# Patient Record
Sex: FEMALE | Race: BLACK OR AFRICAN AMERICAN | Employment: UNEMPLOYED | URBAN - METROPOLITAN AREA
[De-identification: names, ages, dates, MRNs, and addresses within clinical notes are randomized per-mention and may not be internally consistent; named-entity substitution may affect disease eponyms.]

---

## 2017-01-27 ENCOUNTER — TELEPHONE (OUTPATIENT)
Dept: ORTHOPEDIC SURGERY | Age: 62
End: 2017-01-27

## 2017-02-17 ENCOUNTER — TELEPHONE (OUTPATIENT)
Dept: ORTHOPEDIC SURGERY | Age: 62
End: 2017-02-17

## 2017-02-20 RX ORDER — TRAMADOL HYDROCHLORIDE 50 MG/1
50 TABLET ORAL EVERY 6 HOURS PRN
Qty: 40 TABLET | Refills: 0 | Status: CANCELLED | OUTPATIENT
Start: 2017-02-20

## 2017-02-27 ENCOUNTER — HOSPITAL ENCOUNTER (OUTPATIENT)
Dept: PHYSICAL THERAPY | Age: 62
Discharge: HOME | End: 2017-02-28
Admitting: ORTHOPAEDIC SURGERY

## 2017-04-05 ENCOUNTER — HOSPITAL ENCOUNTER (OUTPATIENT)
Dept: PHYSICAL THERAPY | Age: 62
Discharge: HOME | End: 2017-04-30
Admitting: ORTHOPAEDIC SURGERY

## 2017-09-08 PROBLEM — G45.9 TIA (TRANSIENT ISCHEMIC ATTACK): Status: ACTIVE | Noted: 2017-09-08

## 2017-09-08 PROBLEM — R91.1 NODULE OF RIGHT LUNG: Status: ACTIVE | Noted: 2017-09-08

## 2018-08-26 PROBLEM — I63.9 STROKE OF UNKNOWN CAUSE (HCC): Status: ACTIVE | Noted: 2018-08-26

## 2018-08-26 PROBLEM — R20.2 FACIAL PARESTHESIA: Status: ACTIVE | Noted: 2018-08-26

## 2018-10-24 RX ORDER — CALCIUM CARBONATE 500(1250)
500 TABLET ORAL DAILY
COMMUNITY
End: 2021-08-09

## 2018-10-26 ENCOUNTER — APPOINTMENT (OUTPATIENT)
Dept: GENERAL RADIOLOGY | Age: 63
End: 2018-10-26
Attending: PODIATRIST
Payer: MEDICAID

## 2018-10-26 ENCOUNTER — ANESTHESIA (OUTPATIENT)
Dept: OPERATING ROOM | Age: 63
End: 2018-10-26
Payer: MEDICAID

## 2018-10-26 ENCOUNTER — ANESTHESIA EVENT (OUTPATIENT)
Dept: OPERATING ROOM | Age: 63
End: 2018-10-26
Payer: MEDICAID

## 2018-10-26 ENCOUNTER — HOSPITAL ENCOUNTER (OUTPATIENT)
Age: 63
Setting detail: OUTPATIENT SURGERY
Discharge: HOME | End: 2018-10-26
Attending: PODIATRIST | Admitting: PODIATRIST
Payer: MEDICAID

## 2018-10-26 VITALS
TEMPERATURE: 97.7 F | OXYGEN SATURATION: 100 % | DIASTOLIC BLOOD PRESSURE: 57 MMHG | SYSTOLIC BLOOD PRESSURE: 109 MMHG | RESPIRATION RATE: 20 BRPM

## 2018-10-26 VITALS
BODY MASS INDEX: 35.91 KG/M2 | HEART RATE: 75 BPM | OXYGEN SATURATION: 96 % | DIASTOLIC BLOOD PRESSURE: 73 MMHG | RESPIRATION RATE: 14 BRPM | HEIGHT: 67 IN | SYSTOLIC BLOOD PRESSURE: 103 MMHG | WEIGHT: 228.8 LBS | TEMPERATURE: 97.5 F

## 2018-10-26 LAB
GLUCOSE BLD-MCNC: 101 MG/DL (ref 70–99)
GLUCOSE BLD-MCNC: 102 MG/DL (ref 70–99)
PERFORMED ON: ABNORMAL
PERFORMED ON: ABNORMAL

## 2018-10-26 PROCEDURE — 7100000011 HC PHASE II RECOVERY - ADDTL 15 MIN: Performed by: PODIATRIST

## 2018-10-26 PROCEDURE — 93010 ELECTROCARDIOGRAM REPORT: CPT | Performed by: INTERNAL MEDICINE

## 2018-10-26 PROCEDURE — 93005 ELECTROCARDIOGRAM TRACING: CPT | Performed by: PODIATRIST

## 2018-10-26 PROCEDURE — 2500000003 HC RX 250 WO HCPCS: Performed by: PODIATRIST

## 2018-10-26 PROCEDURE — 2500000003 HC RX 250 WO HCPCS: Performed by: NURSE ANESTHETIST, CERTIFIED REGISTERED

## 2018-10-26 PROCEDURE — 3700000000 HC ANESTHESIA ATTENDED CARE: Performed by: PODIATRIST

## 2018-10-26 PROCEDURE — 6360000002 HC RX W HCPCS: Performed by: PODIATRIST

## 2018-10-26 PROCEDURE — C9359 IMPLNT,BON VOID FILLER-PUTTY: HCPCS | Performed by: PODIATRIST

## 2018-10-26 PROCEDURE — 7100000001 HC PACU RECOVERY - ADDTL 15 MIN: Performed by: PODIATRIST

## 2018-10-26 PROCEDURE — 6360000002 HC RX W HCPCS: Performed by: NURSE ANESTHETIST, CERTIFIED REGISTERED

## 2018-10-26 PROCEDURE — 2580000003 HC RX 258: Performed by: NURSE ANESTHETIST, CERTIFIED REGISTERED

## 2018-10-26 PROCEDURE — 93005 ELECTROCARDIOGRAM TRACING: CPT

## 2018-10-26 PROCEDURE — 7100000000 HC PACU RECOVERY - FIRST 15 MIN: Performed by: PODIATRIST

## 2018-10-26 PROCEDURE — 6370000000 HC RX 637 (ALT 250 FOR IP): Performed by: ANESTHESIOLOGY

## 2018-10-26 PROCEDURE — 3700000001 HC ADD 15 MINUTES (ANESTHESIA): Performed by: PODIATRIST

## 2018-10-26 PROCEDURE — 2709999900 HC NON-CHARGEABLE SUPPLY: Performed by: PODIATRIST

## 2018-10-26 PROCEDURE — 2580000003 HC RX 258: Performed by: PODIATRIST

## 2018-10-26 PROCEDURE — 3600000004 HC SURGERY LEVEL 4 BASE: Performed by: PODIATRIST

## 2018-10-26 PROCEDURE — 3209999900 FLUORO FOR SURGICAL PROCEDURES

## 2018-10-26 PROCEDURE — 2720000010 HC SURG SUPPLY STERILE: Performed by: PODIATRIST

## 2018-10-26 PROCEDURE — 3600000014 HC SURGERY LEVEL 4 ADDTL 15MIN: Performed by: PODIATRIST

## 2018-10-26 PROCEDURE — 73620 X-RAY EXAM OF FOOT: CPT

## 2018-10-26 PROCEDURE — C1713 ANCHOR/SCREW BN/BN,TIS/BN: HCPCS | Performed by: PODIATRIST

## 2018-10-26 PROCEDURE — 2780000010 HC IMPLANT OTHER: Performed by: PODIATRIST

## 2018-10-26 PROCEDURE — 7100000010 HC PHASE II RECOVERY - FIRST 15 MIN: Performed by: PODIATRIST

## 2018-10-26 PROCEDURE — 6370000000 HC RX 637 (ALT 250 FOR IP)

## 2018-10-26 DEVICE — IMPLANTABLE DEVICE: Type: IMPLANTABLE DEVICE | Site: FOOT | Status: FUNCTIONAL

## 2018-10-26 DEVICE — ALLOGRAFT HUM TISS INJ LG 1 CC STRL ALLOGEN AMINON: Type: IMPLANTABLE DEVICE | Site: FOOT | Status: FUNCTIONAL

## 2018-10-26 DEVICE — MINI MONSTER HEADLESS, SHORT THREAD, 3.0 X 20MM
Type: IMPLANTABLE DEVICE | Site: FOOT | Status: FUNCTIONAL
Brand: MONSTER SCREW SYSTEM

## 2018-10-26 DEVICE — GRAFT BNE 5ML DEMIN BNE MTRX PTY INTERGRO: Type: IMPLANTABLE DEVICE | Site: FOOT | Status: FUNCTIONAL

## 2018-10-26 DEVICE — MINI MONSTER HEADLESS, SHORT THREAD, 3.0 X 18MM
Type: IMPLANTABLE DEVICE | Site: FOOT | Status: FUNCTIONAL
Brand: MONSTER SCREW SYSTEM

## 2018-10-26 RX ORDER — ONDANSETRON 2 MG/ML
4 INJECTION INTRAMUSCULAR; INTRAVENOUS
Status: DISCONTINUED | OUTPATIENT
Start: 2018-10-26 | End: 2018-10-26 | Stop reason: HOSPADM

## 2018-10-26 RX ORDER — FENTANYL CITRATE 50 UG/ML
50 INJECTION, SOLUTION INTRAMUSCULAR; INTRAVENOUS EVERY 5 MIN PRN
Status: DISCONTINUED | OUTPATIENT
Start: 2018-10-26 | End: 2018-10-26 | Stop reason: HOSPADM

## 2018-10-26 RX ORDER — DIPHENHYDRAMINE HYDROCHLORIDE 50 MG/ML
12.5 INJECTION INTRAMUSCULAR; INTRAVENOUS
Status: DISCONTINUED | OUTPATIENT
Start: 2018-10-26 | End: 2018-10-26 | Stop reason: HOSPADM

## 2018-10-26 RX ORDER — OXYCODONE HYDROCHLORIDE AND ACETAMINOPHEN 5; 325 MG/1; MG/1
TABLET ORAL
Status: COMPLETED
Start: 2018-10-26 | End: 2018-10-26

## 2018-10-26 RX ORDER — PROPOFOL 10 MG/ML
INJECTION, EMULSION INTRAVENOUS PRN
Status: DISCONTINUED | OUTPATIENT
Start: 2018-10-26 | End: 2018-10-26 | Stop reason: SDUPTHER

## 2018-10-26 RX ORDER — LIDOCAINE HYDROCHLORIDE 10 MG/ML
0.5 INJECTION, SOLUTION EPIDURAL; INFILTRATION; INTRACAUDAL; PERINEURAL ONCE
Status: DISCONTINUED | OUTPATIENT
Start: 2018-10-26 | End: 2018-10-26 | Stop reason: HOSPADM

## 2018-10-26 RX ORDER — FENTANYL CITRATE 50 UG/ML
25 INJECTION, SOLUTION INTRAMUSCULAR; INTRAVENOUS EVERY 5 MIN PRN
Status: DISCONTINUED | OUTPATIENT
Start: 2018-10-26 | End: 2018-10-26 | Stop reason: HOSPADM

## 2018-10-26 RX ORDER — FIBRINOGEN HUMAN AND THROMBIN HUMAN 10 ML
KIT TOPICAL
Status: COMPLETED | OUTPATIENT
Start: 2018-10-26 | End: 2018-10-26

## 2018-10-26 RX ORDER — SODIUM CHLORIDE 9 MG/ML
INJECTION, SOLUTION INTRAVENOUS CONTINUOUS
Status: DISCONTINUED | OUTPATIENT
Start: 2018-10-26 | End: 2018-10-26 | Stop reason: HOSPADM

## 2018-10-26 RX ORDER — MEPERIDINE HYDROCHLORIDE 25 MG/ML
12.5 INJECTION INTRAMUSCULAR; INTRAVENOUS; SUBCUTANEOUS EVERY 5 MIN PRN
Status: DISCONTINUED | OUTPATIENT
Start: 2018-10-26 | End: 2018-10-26 | Stop reason: HOSPADM

## 2018-10-26 RX ORDER — CEFAZOLIN SODIUM 2 G/100ML
2 INJECTION, SOLUTION INTRAVENOUS
Status: COMPLETED | OUTPATIENT
Start: 2018-10-26 | End: 2018-10-26

## 2018-10-26 RX ORDER — SODIUM CHLORIDE 9 MG/ML
INJECTION, SOLUTION INTRAVENOUS CONTINUOUS PRN
Status: DISCONTINUED | OUTPATIENT
Start: 2018-10-26 | End: 2018-10-26 | Stop reason: SDUPTHER

## 2018-10-26 RX ORDER — DEXAMETHASONE SODIUM PHOSPHATE 4 MG/ML
INJECTION, SOLUTION INTRA-ARTICULAR; INTRALESIONAL; INTRAMUSCULAR; INTRAVENOUS; SOFT TISSUE PRN
Status: DISCONTINUED | OUTPATIENT
Start: 2018-10-26 | End: 2018-10-26 | Stop reason: SDUPTHER

## 2018-10-26 RX ORDER — LIDOCAINE HYDROCHLORIDE 20 MG/ML
INJECTION, SOLUTION INFILTRATION; PERINEURAL
Status: COMPLETED | OUTPATIENT
Start: 2018-10-26 | End: 2018-10-26

## 2018-10-26 RX ORDER — ONDANSETRON 2 MG/ML
INJECTION INTRAMUSCULAR; INTRAVENOUS PRN
Status: DISCONTINUED | OUTPATIENT
Start: 2018-10-26 | End: 2018-10-26 | Stop reason: SDUPTHER

## 2018-10-26 RX ORDER — ACETAMINOPHEN 650 MG
TABLET, EXTENDED RELEASE ORAL
Status: COMPLETED | OUTPATIENT
Start: 2018-10-26 | End: 2018-10-26

## 2018-10-26 RX ORDER — LIDOCAINE HYDROCHLORIDE 20 MG/ML
INJECTION, SOLUTION INFILTRATION; PERINEURAL PRN
Status: DISCONTINUED | OUTPATIENT
Start: 2018-10-26 | End: 2018-10-26 | Stop reason: SDUPTHER

## 2018-10-26 RX ORDER — LABETALOL HYDROCHLORIDE 5 MG/ML
5 INJECTION, SOLUTION INTRAVENOUS EVERY 10 MIN PRN
Status: DISCONTINUED | OUTPATIENT
Start: 2018-10-26 | End: 2018-10-26 | Stop reason: HOSPADM

## 2018-10-26 RX ORDER — OXYCODONE HYDROCHLORIDE AND ACETAMINOPHEN 5; 325 MG/1; MG/1
1 TABLET ORAL
Status: COMPLETED | OUTPATIENT
Start: 2018-10-26 | End: 2018-10-26

## 2018-10-26 RX ORDER — MIDAZOLAM HYDROCHLORIDE 1 MG/ML
INJECTION INTRAMUSCULAR; INTRAVENOUS PRN
Status: DISCONTINUED | OUTPATIENT
Start: 2018-10-26 | End: 2018-10-26 | Stop reason: SDUPTHER

## 2018-10-26 RX ORDER — BUPIVACAINE HYDROCHLORIDE 5 MG/ML
INJECTION, SOLUTION EPIDURAL; INTRACAUDAL
Status: COMPLETED | OUTPATIENT
Start: 2018-10-26 | End: 2018-10-26

## 2018-10-26 RX ORDER — MAGNESIUM HYDROXIDE 1200 MG/15ML
LIQUID ORAL CONTINUOUS PRN
Status: DISCONTINUED | OUTPATIENT
Start: 2018-10-26 | End: 2018-10-26 | Stop reason: HOSPADM

## 2018-10-26 RX ORDER — FENTANYL CITRATE 50 UG/ML
INJECTION, SOLUTION INTRAMUSCULAR; INTRAVENOUS PRN
Status: DISCONTINUED | OUTPATIENT
Start: 2018-10-26 | End: 2018-10-26 | Stop reason: SDUPTHER

## 2018-10-26 RX ORDER — ASPIRIN 81 MG/1
81 TABLET, CHEWABLE ORAL DAILY
Status: DISCONTINUED | OUTPATIENT
Start: 2018-10-26 | End: 2018-10-26 | Stop reason: HOSPADM

## 2018-10-26 RX ADMIN — ONDANSETRON HYDROCHLORIDE 4 MG: 2 SOLUTION INTRAMUSCULAR; INTRAVENOUS at 07:54

## 2018-10-26 RX ADMIN — FENTANYL CITRATE 25 MCG: 50 INJECTION, SOLUTION INTRAMUSCULAR; INTRAVENOUS at 07:44

## 2018-10-26 RX ADMIN — OXYCODONE HYDROCHLORIDE AND ACETAMINOPHEN 1 TABLET: 5; 325 TABLET ORAL at 10:31

## 2018-10-26 RX ADMIN — CEFAZOLIN SODIUM 2 G: 2 INJECTION, SOLUTION INTRAVENOUS at 07:35

## 2018-10-26 RX ADMIN — LIDOCAINE HYDROCHLORIDE 80 MG: 20 INJECTION, SOLUTION INFILTRATION; PERINEURAL at 07:41

## 2018-10-26 RX ADMIN — PROPOFOL 200 MG: 10 INJECTION, EMULSION INTRAVENOUS at 07:45

## 2018-10-26 RX ADMIN — DEXAMETHASONE SODIUM PHOSPHATE 4 MG: 4 INJECTION, SOLUTION INTRAMUSCULAR; INTRAVENOUS at 07:54

## 2018-10-26 RX ADMIN — SODIUM CHLORIDE: 9 INJECTION, SOLUTION INTRAVENOUS at 07:34

## 2018-10-26 RX ADMIN — ASPIRIN 81 MG 81 MG: 81 TABLET ORAL at 07:23

## 2018-10-26 RX ADMIN — MIDAZOLAM HYDROCHLORIDE 2 MG: 1 INJECTION, SOLUTION INTRAMUSCULAR; INTRAVENOUS at 07:38

## 2018-10-26 RX ADMIN — SODIUM CHLORIDE: 9 INJECTION, SOLUTION INTRAVENOUS at 07:21

## 2018-10-26 ASSESSMENT — PULMONARY FUNCTION TESTS
PIF_VALUE: 6
PIF_VALUE: 5
PIF_VALUE: 2
PIF_VALUE: 3
PIF_VALUE: 2
PIF_VALUE: 3
PIF_VALUE: 5
PIF_VALUE: 5
PIF_VALUE: 6
PIF_VALUE: 3
PIF_VALUE: 6
PIF_VALUE: 5
PIF_VALUE: 6
PIF_VALUE: 2
PIF_VALUE: 1
PIF_VALUE: 6
PIF_VALUE: 19
PIF_VALUE: 2
PIF_VALUE: 6
PIF_VALUE: 2
PIF_VALUE: 3
PIF_VALUE: 6
PIF_VALUE: 2
PIF_VALUE: 5
PIF_VALUE: 6
PIF_VALUE: 6
PIF_VALUE: 5
PIF_VALUE: 3
PIF_VALUE: 6
PIF_VALUE: 5
PIF_VALUE: 1
PIF_VALUE: 2
PIF_VALUE: 6
PIF_VALUE: 3
PIF_VALUE: 6
PIF_VALUE: 1
PIF_VALUE: 6
PIF_VALUE: 6
PIF_VALUE: 1
PIF_VALUE: 5
PIF_VALUE: 3
PIF_VALUE: 6
PIF_VALUE: 5
PIF_VALUE: 3
PIF_VALUE: 6
PIF_VALUE: 6
PIF_VALUE: 5
PIF_VALUE: 6
PIF_VALUE: 2
PIF_VALUE: 2
PIF_VALUE: 6
PIF_VALUE: 4
PIF_VALUE: 4
PIF_VALUE: 2
PIF_VALUE: 6
PIF_VALUE: 4
PIF_VALUE: 8
PIF_VALUE: 6
PIF_VALUE: 5
PIF_VALUE: 6
PIF_VALUE: 6
PIF_VALUE: 2
PIF_VALUE: 6
PIF_VALUE: 5
PIF_VALUE: 8
PIF_VALUE: 4

## 2018-10-26 ASSESSMENT — PAIN DESCRIPTION - DESCRIPTORS: DESCRIPTORS: ACHING

## 2018-10-26 ASSESSMENT — PAIN SCALES - GENERAL: PAINLEVEL_OUTOF10: 2

## 2018-10-26 ASSESSMENT — PAIN - FUNCTIONAL ASSESSMENT: PAIN_FUNCTIONAL_ASSESSMENT: 0-10

## 2018-10-26 NOTE — ANESTHESIA PRE PROCEDURE
Department of Anesthesiology  Preprocedure Note       Name:  Nae Valente   Age:  63 y.o.  :  1955                                          MRN:  [id number]         Date:  10/26/2018      Surgeon: Triston Lira):  Saundra Haney DPM    Procedure: MODIFIED YEN BUNIONECTOMY WITH FIRST METATARSAL OSTEOTOMY WITH  Erling Juanpablo OSTEOTOMY, LEFT FOOT (Left )    Medications prior to admission:   Prior to Admission medications    Medication Sig Start Date End Date Taking? Authorizing Provider   calcium carbonate (OSCAL) 500 MG TABS tablet Take 500 mg by mouth daily   Yes Historical Provider, MD   nicotine (NICODERM CQ) 14 MG/24HR Place 1 patch onto the skin every 24 hours 18  Julaine Severin, MD   aspirin 325 MG EC tablet Take 1 tablet by mouth daily 18  Julaine Severin, MD   atorvastatin (LIPITOR) 40 MG tablet Take 1 tablet by mouth nightly 18   Julaine Severin, MD   losartan-hydrochlorothiazide (HYZAAR) 50-12.5 MG per tablet Take 1 tablet by mouth daily    Historical Provider, MD   metFORMIN (GLUCOPHAGE) 500 MG tablet Take 500 mg by mouth daily (with breakfast)    Historical Provider, MD   potassium chloride (K-TAB) 10 MEQ extended release tablet Take 10 mEq by mouth daily    Historical Provider, MD   Cholecalciferol (VITAMIN D3) 2000 UNITS CAPS Take 1,000 Units by mouth daily     Historical Provider, MD   albuterol (PROVENTIL HFA;VENTOLIN HFA) 108 (90 BASE) MCG/ACT inhaler Inhale 2 puffs into the lungs every 6 hours as needed for Wheezing or Shortness of Breath. Historical Provider, MD       Current medications:    No current facility-administered medications for this encounter.       Current Outpatient Prescriptions   Medication Sig Dispense Refill    calcium carbonate (OSCAL) 500 MG TABS tablet Take 500 mg by mouth daily      nicotine (NICODERM CQ) 14 MG/24HR Place 1 patch onto the skin every 24 hours 30 patch 3    aspirin 325 MG EC tablet Take 1 tablet by mouth daily 30  Hypertension     Tobacco dependence        Past Surgical History:        Procedure Laterality Date    JOINT REPLACEMENT Right     Right knee    TONSILLECTOMY      TUBAL LIGATION      VENTRAL HERNIA REPAIR         Social History:    Social History   Substance Use Topics    Smoking status: Current Every Day Smoker     Packs/day: 0.25     Years: 22.00     Types: Cigarettes    Smokeless tobacco: Never Used    Alcohol use Yes      Comment: OCC BEER                                Ready to quit: Not Answered  Counseling given: Not Answered      Vital Signs (Current):   Vitals:    10/24/18 1303   Weight: 200 lb (90.7 kg)   Height: 5' 6\" (1.676 m)                                              BP Readings from Last 3 Encounters:   08/27/18 123/81   09/08/17 115/82   08/14/17 102/68       NPO Status:                                                                                 BMI:   Wt Readings from Last 3 Encounters:   08/26/18 224 lb 3.2 oz (101.7 kg)   09/08/17 223 lb 3.2 oz (101.2 kg)   08/14/17 233 lb 0.4 oz (105.7 kg)     Body mass index is 32.28 kg/m². CBC:   Lab Results   Component Value Date    WBC 4.9 08/27/2018    RBC 4.24 08/27/2018    HGB 12.2 08/27/2018    HCT 37.4 08/27/2018    MCV 88.2 08/27/2018    RDW 15.2 08/27/2018     08/27/2018       CMP:   Lab Results   Component Value Date     08/27/2018    K 3.9 08/27/2018     08/27/2018    CO2 25 08/27/2018    BUN 14 08/27/2018    CREATININE 0.7 08/27/2018    GFRAA >60 08/27/2018    AGRATIO 1.5 08/26/2018    LABGLOM >60 08/27/2018    GLUCOSE 119 08/27/2018    PROT 7.2 08/26/2018    CALCIUM 9.2 08/27/2018    BILITOT 0.5 08/26/2018    ALKPHOS 65 08/26/2018    AST 21 08/26/2018    ALT 13 08/26/2018       POC Tests: No results for input(s): POCGLU, POCNA, POCK, POCCL, POCBUN, POCHEMO, POCHCT in the last 72 hours.     Coags:   Lab Results   Component Value Date    PROTIME 10.5 08/26/2018    INR 0.92 08/26/2018    APTT 34.1 08/26/2018 HCG (If Applicable): No results found for: PREGTESTUR, PREGSERUM, HCG, HCGQUANT     ABGs: No results found for: PHART, PO2ART, MVN5QLU, TAQ0HVD, BEART, Q7CCKXWU     Type & Screen (If Applicable):  Lab Results   Component Value Date    LABABO OPOSITIVE 01/13/2017       Anesthesia Evaluation  Patient summary reviewed and Nursing notes reviewed  Airway: Mallampati: II  TM distance: >3 FB   Neck ROM: full  Mouth opening: > = 3 FB Dental:          Pulmonary:   (+) asthma:                            Cardiovascular:  Exercise tolerance: good (>4 METS),   (+) hypertension: mild,                   Neuro/Psych:   (+) TIA,    (-) CVA and headaches           GI/Hepatic/Renal:             Endo/Other:    (+) DiabetesType II DM, poorly controlled, , .                 Abdominal:           Vascular: negative vascular ROS. Anesthesia Plan      general     ASA 3     (Patient arrived late. Very difficult IV start . ManualTemplate  Copied        Knute Laura  Neurology Follow-up  [facility]     Date of Service: 8/27/2018     Subjective:   CC: Follow up today regarding: right facial numbness     Events noted. Chart and lab reviewed. The patient is back to her baseline. No numbness, weakness, CP, headaches or visual changes. No other new sx today.  MRI brain showed no acute stroke or WMD        ROS : A 10-12 system review of constitutional, cardiovascular, respiratory, musculoskeletal, endocrine, skin, SHEENT, genitourinary, psychiatric and neurologic systems was obtained and updated today and is unremarkable except as mentioned  in my interval history.           Past Medical History  Past Medical History:  Diagnosis Date   Anemia     Arthritis of knee     CVA (cerebral vascular accident) ([facility] [address]      left side weakness   Heart disease      ? TYPE   Hypertension     Tobacco dependence           Current Facility-Administered Medications  Current Facility-Administered

## 2018-10-26 NOTE — PROGRESS NOTES
Patient ready for discharge, patient reports the numbness is wearing off, requests pain pill. Dr Jones Castillo called, order received. Ok to give and dc.
Pt is her, ready for surgery , marked by dr Marya Valentino. .. 7:12 am
TANYA VESSEL LOOP DRAIN TO LEFT FOOT PER DR. Jude Brown  REF # [id number]  LOT# [id number]  EXP : 2023-05-21
vss on room air, denies pain, drinking fluids, moved to phase 2 care. Sister at bedside. Patient given and instructed on use of incentive spirometer. Patient demonstrated use, to be sent home with patient. Post op shoe applied. Crutches given with instructions.
26. Other__________________________________________   *Please call pre admission testing if you any further questions   [address]    [phone] [facility]  [phone]   [address] [place] [address]       All above information reviewed with patient in person or by phone. Patient verbalizes understanding. All questions and concerns addressed.                                                                                                  Patient/Rep____________________                                                                                                                                    PRE OP INSTRUCTIONS

## 2018-10-26 NOTE — OP NOTE
[place] [address]                    [address] [facility], [address] [facility]                               OPERATIVE REPORT    PATIENT NAME: Murali Pham                     :         1955  MED REC NO:   [id number]                          ROOM:  ACCOUNT NO:   [de-identified]                           ADMIT DATE:  10/26/2018  PROVIDER:     Diamond Mckinney DPM    DATE OF PROCEDURE:  10/26/2018    PREOPERATIVE DIAGNOSES:  1. Hallux abductovalgus metatarsus primus varus deformity, left foot. 2.  Bunion deformity, left foot. 3.  Hallux rigidus, left foot. 4.  Osteochondral defect, first MTP joint, left foot. 5.  Dislocation of IP joint. 6.  Pain. POSTOPERATIVE DIAGNOSES:  1. Hallux abductovalgus metatarsus primus varus deformity, left foot. 2.  Bunion deformity, left foot. 3.  Hallux rigidus, left foot. 4.  Osteochondral defect, first MTP joint, left foot. 5.  Dislocation of IP joint. 6.  Pain. PROCEDURE:  1. Double osteotomy, first ray, right foot. 2.  Osteochondral defect repair with implants. SURGEON:  Diamond Mckinney DPM.    ANESTHESIA:  General.    COMPLICATIONS:  None noted. PROCEDURE IN DETAIL:  Under mild sedation, the patient was brought to  the operating room and placed on the operating table in the supine  position. Pneumatic ankle cuff was then placed about the patient's  right ankle. Following general anesthesia, local anesthesia was  obtained about the dorsal aspect of the first ray of the right foot  utilizing 10 mL of 2% lidocaine plain. Foot and lower leg were then  scrubbed, prepped, and draped in the usual aseptic manner. An Esmarch  bandage was then utilized to exsanguinate the patient's left foot and  a pneumatic ankle cuff, which was placed about the patient's left  ankle with excessive cast padding 2 cm above the fibular head level,  was insufflated to 250 mmHg, left.     Attention was then directed to the dorsal aspect of the first opposition  was noted. Please note that there were cystic formations within the  bone, so we used bone graft over this particular region to help this  out. After this was done, we made a second cut for an osteotomy in  the proximal phalanx. The first cut was then placed parallel to the  cartilage surface of the proximal phalanx base. Second cut was then  placed parallel to the IP joint surface with more bone being taken  medially than laterally. This point, 3 cm wedge resection of bone was  then performed. The osteotomy was then feathered greenstick and then  brought back down for full compression. K-wire was then driven from  the dorsal, proximal, medial aspect to the plantar, distal, lateral  aspect of the proximal phalanx. This was measured in standard AO  principles and techniques and then utilizing a 3.0, 20 mm headless  cannulated short thread screw from [place] [address], this was inserted down  the wire length from dorsal, medial, proximal to plantar, distal,  lateral encompassing the osteotomy site for the Akin. Excellent  alignment and correction deformity was noted. Excellent realignment  of the proximal phalanx from the first MTP joint was noted. At this  time, we resected the bone spurring from the dorsal aspect of the  proximal phalanx with a rongeur. We power rasped all the area of the  proximal phalanx and first metatarsal head down to full contour. After this was done, we put it through range of motion. There was  excellent range of motion with no crepitation. Final part of the  procedure was to plantar flex the proximal phalanx. We drilled the  osteochondral defect was 62 K-wire, took a bur to create dimension  down, so we could put a cartilage graft on this particular region. We  measured out at 1.3 cm graft and we went ahead and trimmed this.    After this was trimmed at this particular time, we cut this out with a  15-blade, measured it several times, cut it and trimmed it, and

## 2018-11-28 ENCOUNTER — APPOINTMENT (OUTPATIENT)
Dept: CT IMAGING | Age: 63
End: 2018-11-28
Payer: MEDICAID

## 2018-11-28 ENCOUNTER — HOSPITAL ENCOUNTER (EMERGENCY)
Age: 63
Discharge: HOME | End: 2018-11-28
Attending: EMERGENCY MEDICINE
Payer: MEDICAID

## 2018-11-28 VITALS
HEIGHT: 67 IN | OXYGEN SATURATION: 99 % | DIASTOLIC BLOOD PRESSURE: 74 MMHG | BODY MASS INDEX: 35 KG/M2 | SYSTOLIC BLOOD PRESSURE: 114 MMHG | RESPIRATION RATE: 18 BRPM | HEART RATE: 73 BPM | TEMPERATURE: 98.4 F | WEIGHT: 223 LBS

## 2018-11-28 DIAGNOSIS — R51.9 FACIAL DISCOMFORT: Primary | ICD-10-CM

## 2018-11-28 DIAGNOSIS — M54.2 NECK PAIN: ICD-10-CM

## 2018-11-28 LAB
A/G RATIO: 1.5 (ref 1.1–2.2)
ALBUMIN SERPL-MCNC: 4 G/DL (ref 3.4–5)
ALP BLD-CCNC: 57 U/L (ref 40–129)
ALT SERPL-CCNC: 29 U/L (ref 10–40)
ANION GAP SERPL CALCULATED.3IONS-SCNC: 9 MMOL/L (ref 3–16)
APTT: 33.1 SEC (ref 26–36)
AST SERPL-CCNC: 27 U/L (ref 15–37)
BASOPHILS ABSOLUTE: 0 K/UL (ref 0–0.2)
BASOPHILS RELATIVE PERCENT: 0.7 %
BILIRUB SERPL-MCNC: <0.2 MG/DL (ref 0–1)
BUN BLDV-MCNC: 12 MG/DL (ref 7–20)
CALCIUM SERPL-MCNC: 10.2 MG/DL (ref 8.3–10.6)
CHLORIDE BLD-SCNC: 105 MMOL/L (ref 99–110)
CO2: 25 MMOL/L (ref 21–32)
CREAT SERPL-MCNC: 0.8 MG/DL (ref 0.6–1.2)
EKG ATRIAL RATE: 66 BPM
EKG DIAGNOSIS: NORMAL
EKG P AXIS: 60 DEGREES
EKG P-R INTERVAL: 178 MS
EKG Q-T INTERVAL: 390 MS
EKG QRS DURATION: 74 MS
EKG QTC CALCULATION (BAZETT): 408 MS
EKG R AXIS: -13 DEGREES
EKG T AXIS: 7 DEGREES
EKG VENTRICULAR RATE: 66 BPM
EOSINOPHILS ABSOLUTE: 0.2 K/UL (ref 0–0.6)
EOSINOPHILS RELATIVE PERCENT: 2.9 %
GFR AFRICAN AMERICAN: >60
GFR NON-AFRICAN AMERICAN: >60
GLOBULIN: 2.6 G/DL
GLUCOSE BLD-MCNC: 109 MG/DL (ref 70–99)
HCT VFR BLD CALC: 37.8 % (ref 36–48)
HEMOGLOBIN: 12.5 G/DL (ref 12–16)
INR BLD: 0.92 (ref 0.86–1.14)
LYMPHOCYTES ABSOLUTE: 2.9 K/UL (ref 1–5.1)
LYMPHOCYTES RELATIVE PERCENT: 50.3 %
MCH RBC QN AUTO: 29.5 PG (ref 26–34)
MCHC RBC AUTO-ENTMCNC: 33 G/DL (ref 31–36)
MCV RBC AUTO: 89.3 FL (ref 80–100)
MONOCYTES ABSOLUTE: 0.5 K/UL (ref 0–1.3)
MONOCYTES RELATIVE PERCENT: 8 %
NEUTROPHILS ABSOLUTE: 2.2 K/UL (ref 1.7–7.7)
NEUTROPHILS RELATIVE PERCENT: 38.1 %
PDW BLD-RTO: 14.7 % (ref 12.4–15.4)
PLATELET # BLD: 252 K/UL (ref 135–450)
PMV BLD AUTO: 7.8 FL (ref 5–10.5)
POTASSIUM SERPL-SCNC: 4.4 MMOL/L (ref 3.5–5.1)
PROTHROMBIN TIME: 10.5 SEC (ref 9.8–13)
RBC # BLD: 4.24 M/UL (ref 4–5.2)
SODIUM BLD-SCNC: 139 MMOL/L (ref 136–145)
TOTAL PROTEIN: 6.6 G/DL (ref 6.4–8.2)
WBC # BLD: 5.7 K/UL (ref 4–11)

## 2018-11-28 PROCEDURE — 70450 CT HEAD/BRAIN W/O DYE: CPT

## 2018-11-28 PROCEDURE — 85610 PROTHROMBIN TIME: CPT

## 2018-11-28 PROCEDURE — 93005 ELECTROCARDIOGRAM TRACING: CPT | Performed by: PHYSICIAN ASSISTANT

## 2018-11-28 PROCEDURE — 80053 COMPREHEN METABOLIC PANEL: CPT

## 2018-11-28 PROCEDURE — 93010 ELECTROCARDIOGRAM REPORT: CPT | Performed by: INTERNAL MEDICINE

## 2018-11-28 PROCEDURE — 6360000004 HC RX CONTRAST MEDICATION: Performed by: EMERGENCY MEDICINE

## 2018-11-28 PROCEDURE — 6370000000 HC RX 637 (ALT 250 FOR IP): Performed by: PHYSICIAN ASSISTANT

## 2018-11-28 PROCEDURE — 85025 COMPLETE CBC W/AUTO DIFF WBC: CPT

## 2018-11-28 PROCEDURE — 70496 CT ANGIOGRAPHY HEAD: CPT

## 2018-11-28 PROCEDURE — 70498 CT ANGIOGRAPHY NECK: CPT

## 2018-11-28 PROCEDURE — 99284 EMERGENCY DEPT VISIT MOD MDM: CPT

## 2018-11-28 PROCEDURE — 85730 THROMBOPLASTIN TIME PARTIAL: CPT

## 2018-11-28 RX ORDER — CYCLOBENZAPRINE HCL 10 MG
10 TABLET ORAL ONCE
Status: COMPLETED | OUTPATIENT
Start: 2018-11-28 | End: 2018-11-28

## 2018-11-28 RX ORDER — LIDOCAINE 50 MG/G
1 PATCH TOPICAL DAILY
Status: DISCONTINUED | OUTPATIENT
Start: 2018-11-28 | End: 2018-11-28 | Stop reason: HOSPADM

## 2018-11-28 RX ORDER — LIDOCAINE 50 MG/G
1 PATCH TOPICAL DAILY
Qty: 30 PATCH | Refills: 0 | Status: SHIPPED | OUTPATIENT
Start: 2018-11-28 | End: 2021-08-09

## 2018-11-28 RX ADMIN — IOPAMIDOL 75 ML: 755 INJECTION, SOLUTION INTRAVENOUS at 12:35

## 2018-11-28 RX ADMIN — CYCLOBENZAPRINE 10 MG: 10 TABLET, FILM COATED ORAL at 10:59

## 2018-11-28 ASSESSMENT — PAIN SCALES - GENERAL: PAINLEVEL_OUTOF10: 3

## 2018-11-28 ASSESSMENT — ENCOUNTER SYMPTOMS
VOMITING: 0
RHINORRHEA: 0
NAUSEA: 0
ABDOMINAL PAIN: 0
DIARRHEA: 0
COUGH: 0
SHORTNESS OF BREATH: 0

## 2018-11-28 ASSESSMENT — PAIN DESCRIPTION - ORIENTATION: ORIENTATION: RIGHT

## 2018-11-28 ASSESSMENT — PAIN DESCRIPTION - PROGRESSION: CLINICAL_PROGRESSION: GRADUALLY IMPROVING

## 2018-11-28 ASSESSMENT — PAIN DESCRIPTION - PAIN TYPE: TYPE: ACUTE PAIN

## 2018-11-28 ASSESSMENT — PAIN DESCRIPTION - LOCATION: LOCATION: SHOULDER

## 2018-11-28 NOTE — ED PROVIDER NOTES
[address] [facility]  eMERGENCY dEPARTMENT eNCOUnter        Pt Name: Hans Gallegos  MRN: [id number]  [place] 1955  Date of evaluation: 11/28/2018  Provider: Napoleon Giron PA-C  PCP: NOHEMY Rodrigues    This patient was seen and evaluated by the attending physician Theodora Desir MD.      [address] [facility]       Chief Complaint   Patient presents with    Facial Swelling     pt states she has right side facial swelling, shooting pain from right shoulder up into head that started Sunday. Denies any fever or dental pain       HISTORY OF PRESENT ILLNESS   (Location/Symptom, Timing/Onset, Context/Setting, Quality, Duration, Modifying Factors, Severity)  Note limiting factors. Hans Gallegos is a 66 y.o. female presents to the emergency department today for evaluation for a discomfort to the right side of the face. The patient states that on Sunday she states that she turned her head and rolled over on the right and she states that she experienced a discomfort to her right arm which radiated up into her neck and into her head. The patient states that she has had some mild discomfort since that time she states that it is improving since Sunday. She denies falling or injuring her head or neck area in anyway, she states that she is concerned about this and she would like to be evaluated. She states that she does have a history of strokes, but she feels that this is different. She denies any numbness, tingling or weakness. No visual changes. She denies any fever or chills. No cough or congestion. No chest pain shortness of breath. She denies any abdominal pain, nausea vomiting diarrhea. No urine symptoms. He felt that her right side of her face was swollen earlier but no longer is. She denies any dental pain. She denies any known new exposures. She has no other complaints.     Nursing Notes were all reviewed and agreed with or any disagreements were interpreted by the Emergency Department Physician who either signs orCo-signs this chart in the absence of a cardiologist.  Please see their note for interpretation of EKG. RADIOLOGY:   Non-plain film images such as CT, Ultrasound and MRI are read by the radiologist. Plain radiographic images are visualized andpreliminarily interpreted by the  ED Provider with the below findings:        Interpretation [facility] Radiologist below, if available at the time of this note:    CTA [address]   Final Result   1. No significant stenosis identified within the neck. 2. Redemonstration of a moderate stenosis of the P2 segment of the right   posterior cerebral artery. 3. Otherwise, unremarkable CT angiogram of the [place]. CTA HEAD W CONTRAST   Final Result   1. No significant stenosis identified within the neck. 2. Redemonstration of a moderate stenosis of the P2 segment of the right   posterior cerebral artery. 3. Otherwise, unremarkable CT angiogram of the [place]. CT HEAD WO CONTRAST   Final Result   No acute intracranial abnormality. No results found. PROCEDURES   Unless otherwise noted below, none     Procedures    CRITICAL CARE TIME   N/A    CONSULTS:  None      EMERGENCY DEPARTMENT COURSE and DIFFERENTIALDIAGNOSIS/MDM:   Vitals:    Vitals:    11/28/18 1013 11/28/18 1305   BP: 126/76 114/74   Pulse: 79 73   Resp: 16 18   Temp: 98.4 °F (36.9 °C)    TempSrc: Oral    SpO2: 99% 99%   Weight: 223 lb (101.2 kg)    Height: 5' 7\" (1.702 m)        Patient was given thefollowing medications:  Medications   cyclobenzaprine (FLEXERIL) tablet 10 mg (10 mg Oral Given 11/28/18 1059)   iopamidol (ISOVUE-370) 76 % injection 75 mL (75 mLs Intravenous Given 11/28/18 1235)       The patient presents to the emergency department today for evaluation for a discomfort to the right side of the face.   The patient states that on Sunday she states that she turned her head and rolled

## 2018-11-29 LAB
EKG ATRIAL RATE: 64 BPM
EKG DIAGNOSIS: NORMAL
EKG P AXIS: 56 DEGREES
EKG P-R INTERVAL: 176 MS
EKG Q-T INTERVAL: 376 MS
EKG QRS DURATION: 72 MS
EKG QTC CALCULATION (BAZETT): 387 MS
EKG R AXIS: -3 DEGREES
EKG T AXIS: 20 DEGREES
EKG VENTRICULAR RATE: 64 BPM

## 2019-12-11 ENCOUNTER — APPOINTMENT (OUTPATIENT)
Dept: GENERAL RADIOLOGY | Age: 64
End: 2019-12-11
Payer: MEDICAID

## 2019-12-11 ENCOUNTER — HOSPITAL ENCOUNTER (OUTPATIENT)
Age: 64
Setting detail: OBSERVATION
Discharge: LEFT BEFORE DISCHARGE | End: 2019-12-12
Attending: EMERGENCY MEDICINE | Admitting: INTERNAL MEDICINE
Payer: MEDICAID

## 2019-12-11 ENCOUNTER — APPOINTMENT (OUTPATIENT)
Dept: CT IMAGING | Age: 64
End: 2019-12-11
Payer: MEDICAID

## 2019-12-11 DIAGNOSIS — G45.9 TIA (TRANSIENT ISCHEMIC ATTACK): Primary | ICD-10-CM

## 2019-12-11 LAB
A/G RATIO: 1.5 (ref 1.1–2.2)
ALBUMIN SERPL-MCNC: 4.3 G/DL (ref 3.4–5)
ALP BLD-CCNC: 51 U/L (ref 40–129)
ALT SERPL-CCNC: 13 U/L (ref 10–40)
ANION GAP SERPL CALCULATED.3IONS-SCNC: 12 MMOL/L (ref 3–16)
AST SERPL-CCNC: 28 U/L (ref 15–37)
ATYPICAL LYMPHOCYTE RELATIVE PERCENT: 5 % (ref 0–6)
BASOPHILS ABSOLUTE: 0.1 K/UL (ref 0–0.2)
BASOPHILS RELATIVE PERCENT: 1 %
BILIRUB SERPL-MCNC: 0.3 MG/DL (ref 0–1)
BUN BLDV-MCNC: 17 MG/DL (ref 7–20)
CALCIUM SERPL-MCNC: 9.7 MG/DL (ref 8.3–10.6)
CHLORIDE BLD-SCNC: 103 MMOL/L (ref 99–110)
CO2: 21 MMOL/L (ref 21–32)
CREAT SERPL-MCNC: 0.7 MG/DL (ref 0.6–1.2)
EOSINOPHILS ABSOLUTE: 0.2 K/UL (ref 0–0.6)
EOSINOPHILS RELATIVE PERCENT: 3 %
GFR AFRICAN AMERICAN: >60
GFR NON-AFRICAN AMERICAN: >60
GLOBULIN: 2.9 G/DL
GLUCOSE BLD-MCNC: 96 MG/DL (ref 70–99)
HCT VFR BLD CALC: 39.2 % (ref 36–48)
HEMOGLOBIN: 12.8 G/DL (ref 12–16)
INR BLD: 0.87 (ref 0.86–1.14)
LYMPHOCYTES ABSOLUTE: 4.4 K/UL (ref 1–5.1)
LYMPHOCYTES RELATIVE PERCENT: 67 %
MCH RBC QN AUTO: 29.1 PG (ref 26–34)
MCHC RBC AUTO-ENTMCNC: 32.7 G/DL (ref 31–36)
MCV RBC AUTO: 89 FL (ref 80–100)
MONOCYTES ABSOLUTE: 0.2 K/UL (ref 0–1.3)
MONOCYTES RELATIVE PERCENT: 3 %
NEUTROPHILS ABSOLUTE: 1.3 K/UL (ref 1.7–7.7)
NEUTROPHILS RELATIVE PERCENT: 21 %
PDW BLD-RTO: 14.2 % (ref 12.4–15.4)
PLATELET # BLD: 247 K/UL (ref 135–450)
PMV BLD AUTO: 7.8 FL (ref 5–10.5)
POTASSIUM REFLEX MAGNESIUM: 6.4 MMOL/L (ref 3.5–5.1)
PROTHROMBIN TIME: 10.1 SEC (ref 10–13.2)
RBC # BLD: 4.4 M/UL (ref 4–5.2)
RBC # BLD: NORMAL 10*6/UL
SODIUM BLD-SCNC: 136 MMOL/L (ref 136–145)
TOTAL PROTEIN: 7.2 G/DL (ref 6.4–8.2)
TROPONIN: <0.01 NG/ML
WBC # BLD: 6.1 K/UL (ref 4–11)

## 2019-12-11 PROCEDURE — 36415 COLL VENOUS BLD VENIPUNCTURE: CPT

## 2019-12-11 PROCEDURE — 85025 COMPLETE CBC W/AUTO DIFF WBC: CPT

## 2019-12-11 PROCEDURE — 93005 ELECTROCARDIOGRAM TRACING: CPT | Performed by: EMERGENCY MEDICINE

## 2019-12-11 PROCEDURE — 99285 EMERGENCY DEPT VISIT HI MDM: CPT

## 2019-12-11 PROCEDURE — 71045 X-RAY EXAM CHEST 1 VIEW: CPT

## 2019-12-11 PROCEDURE — 80053 COMPREHEN METABOLIC PANEL: CPT

## 2019-12-11 PROCEDURE — 85610 PROTHROMBIN TIME: CPT

## 2019-12-11 PROCEDURE — 84484 ASSAY OF TROPONIN QUANT: CPT

## 2019-12-11 PROCEDURE — 70450 CT HEAD/BRAIN W/O DYE: CPT

## 2019-12-11 PROCEDURE — 81003 URINALYSIS AUTO W/O SCOPE: CPT

## 2019-12-12 ENCOUNTER — APPOINTMENT (OUTPATIENT)
Dept: MRI IMAGING | Age: 64
End: 2019-12-12
Payer: MEDICAID

## 2019-12-12 ENCOUNTER — APPOINTMENT (OUTPATIENT)
Dept: VASCULAR LAB | Age: 64
End: 2019-12-12
Payer: MEDICAID

## 2019-12-12 VITALS
WEIGHT: 227.96 LBS | TEMPERATURE: 97.9 F | HEART RATE: 71 BPM | DIASTOLIC BLOOD PRESSURE: 93 MMHG | SYSTOLIC BLOOD PRESSURE: 123 MMHG | RESPIRATION RATE: 20 BRPM | HEIGHT: 67 IN | BODY MASS INDEX: 35.78 KG/M2 | OXYGEN SATURATION: 98 %

## 2019-12-12 LAB
BILIRUBIN URINE: NEGATIVE
BLOOD, URINE: NEGATIVE
CHOLESTEROL, TOTAL: 159 MG/DL (ref 0–199)
CLARITY: CLEAR
COLOR: YELLOW
EKG ATRIAL RATE: 73 BPM
EKG DIAGNOSIS: NORMAL
EKG P AXIS: 73 DEGREES
EKG P-R INTERVAL: 178 MS
EKG Q-T INTERVAL: 372 MS
EKG QRS DURATION: 76 MS
EKG QTC CALCULATION (BAZETT): 409 MS
EKG R AXIS: 8 DEGREES
EKG T AXIS: 45 DEGREES
EKG VENTRICULAR RATE: 73 BPM
GLUCOSE BLD-MCNC: 101 MG/DL (ref 70–99)
GLUCOSE BLD-MCNC: 90 MG/DL (ref 70–99)
GLUCOSE URINE: NEGATIVE MG/DL
HDLC SERPL-MCNC: 77 MG/DL (ref 40–60)
KETONES, URINE: NEGATIVE MG/DL
LDL CHOLESTEROL CALCULATED: 58 MG/DL
LEUKOCYTE ESTERASE, URINE: NEGATIVE
LV EF: 58 %
LVEF MODALITY: NORMAL
MICROSCOPIC EXAMINATION: NORMAL
NITRITE, URINE: NEGATIVE
PERFORMED ON: ABNORMAL
PERFORMED ON: NORMAL
PH UA: 6 (ref 5–8)
POTASSIUM SERPL-SCNC: 4.3 MMOL/L (ref 3.5–5.1)
POTASSIUM SERPL-SCNC: 7.8 MMOL/L (ref 3.5–5.1)
PROTEIN UA: NEGATIVE MG/DL
SPECIFIC GRAVITY UA: <=1.005 (ref 1–1.03)
TRIGL SERPL-MCNC: 120 MG/DL (ref 0–150)
URINE TYPE: NORMAL
UROBILINOGEN, URINE: 0.2 E.U./DL
VLDLC SERPL CALC-MCNC: 24 MG/DL

## 2019-12-12 PROCEDURE — 6370000000 HC RX 637 (ALT 250 FOR IP): Performed by: INTERNAL MEDICINE

## 2019-12-12 PROCEDURE — C8929 TTE W OR WO FOL WCON,DOPPLER: HCPCS

## 2019-12-12 PROCEDURE — 36415 COLL VENOUS BLD VENIPUNCTURE: CPT

## 2019-12-12 PROCEDURE — 80061 LIPID PANEL: CPT

## 2019-12-12 PROCEDURE — 97165 OT EVAL LOW COMPLEX 30 MIN: CPT

## 2019-12-12 PROCEDURE — 96372 THER/PROPH/DIAG INJ SC/IM: CPT

## 2019-12-12 PROCEDURE — 93880 EXTRACRANIAL BILAT STUDY: CPT

## 2019-12-12 PROCEDURE — 70551 MRI BRAIN STEM W/O DYE: CPT

## 2019-12-12 PROCEDURE — 84132 ASSAY OF SERUM POTASSIUM: CPT

## 2019-12-12 PROCEDURE — G0378 HOSPITAL OBSERVATION PER HR: HCPCS

## 2019-12-12 PROCEDURE — 92610 EVALUATE SWALLOWING FUNCTION: CPT

## 2019-12-12 PROCEDURE — 83036 HEMOGLOBIN GLYCOSYLATED A1C: CPT

## 2019-12-12 PROCEDURE — 6360000002 HC RX W HCPCS: Performed by: INTERNAL MEDICINE

## 2019-12-12 PROCEDURE — 2580000003 HC RX 258: Performed by: INTERNAL MEDICINE

## 2019-12-12 RX ORDER — DEXTROSE MONOHYDRATE 25 G/50ML
12.5 INJECTION, SOLUTION INTRAVENOUS PRN
Status: DISCONTINUED | OUTPATIENT
Start: 2019-12-12 | End: 2019-12-12 | Stop reason: HOSPADM

## 2019-12-12 RX ORDER — SODIUM CHLORIDE 0.9 % (FLUSH) 0.9 %
10 SYRINGE (ML) INJECTION PRN
Status: DISCONTINUED | OUTPATIENT
Start: 2019-12-12 | End: 2019-12-12 | Stop reason: HOSPADM

## 2019-12-12 RX ORDER — LOSARTAN POTASSIUM AND HYDROCHLOROTHIAZIDE 12.5; 5 MG/1; MG/1
1 TABLET ORAL DAILY
Status: DISCONTINUED | OUTPATIENT
Start: 2019-12-12 | End: 2019-12-12 | Stop reason: HOSPADM

## 2019-12-12 RX ORDER — INSULIN LISPRO 100 [IU]/ML
0-3 INJECTION, SOLUTION INTRAVENOUS; SUBCUTANEOUS NIGHTLY
Status: DISCONTINUED | OUTPATIENT
Start: 2019-12-12 | End: 2019-12-12 | Stop reason: HOSPADM

## 2019-12-12 RX ORDER — DEXTROSE MONOHYDRATE 50 MG/ML
100 INJECTION, SOLUTION INTRAVENOUS PRN
Status: DISCONTINUED | OUTPATIENT
Start: 2019-12-12 | End: 2019-12-12 | Stop reason: HOSPADM

## 2019-12-12 RX ORDER — ONDANSETRON 2 MG/ML
4 INJECTION INTRAMUSCULAR; INTRAVENOUS EVERY 6 HOURS PRN
Status: DISCONTINUED | OUTPATIENT
Start: 2019-12-12 | End: 2019-12-12 | Stop reason: HOSPADM

## 2019-12-12 RX ORDER — LABETALOL 20 MG/4 ML (5 MG/ML) INTRAVENOUS SYRINGE
10 EVERY 10 MIN PRN
Status: DISCONTINUED | OUTPATIENT
Start: 2019-12-12 | End: 2019-12-12 | Stop reason: HOSPADM

## 2019-12-12 RX ORDER — SODIUM CHLORIDE 0.9 % (FLUSH) 0.9 %
10 SYRINGE (ML) INJECTION EVERY 12 HOURS SCHEDULED
Status: DISCONTINUED | OUTPATIENT
Start: 2019-12-12 | End: 2019-12-12 | Stop reason: HOSPADM

## 2019-12-12 RX ORDER — INSULIN LISPRO 100 [IU]/ML
0-6 INJECTION, SOLUTION INTRAVENOUS; SUBCUTANEOUS
Status: DISCONTINUED | OUTPATIENT
Start: 2019-12-12 | End: 2019-12-12 | Stop reason: HOSPADM

## 2019-12-12 RX ORDER — NICOTINE POLACRILEX 4 MG
15 LOZENGE BUCCAL PRN
Status: DISCONTINUED | OUTPATIENT
Start: 2019-12-12 | End: 2019-12-12 | Stop reason: HOSPADM

## 2019-12-12 RX ORDER — ATORVASTATIN CALCIUM 40 MG/1
40 TABLET, FILM COATED ORAL NIGHTLY
Status: DISCONTINUED | OUTPATIENT
Start: 2019-12-12 | End: 2019-12-12 | Stop reason: HOSPADM

## 2019-12-12 RX ADMIN — ASPIRIN 325 MG: 325 TABLET, DELAYED RELEASE ORAL at 12:26

## 2019-12-12 RX ADMIN — ENOXAPARIN SODIUM 40 MG: 40 INJECTION SUBCUTANEOUS at 12:26

## 2019-12-12 RX ADMIN — LOSARTAN POTASSIUM AND HYDROCHLOROTHIAZIDE 1 TABLET: 50; 12.5 TABLET, FILM COATED ORAL at 12:25

## 2019-12-12 RX ADMIN — Medication 10 ML: at 12:27

## 2019-12-12 ASSESSMENT — PAIN SCALES - GENERAL
PAINLEVEL_OUTOF10: 0
PAINLEVEL_OUTOF10: 0

## 2019-12-13 LAB
ESTIMATED AVERAGE GLUCOSE: 122.6 MG/DL
HBA1C MFR BLD: 5.9 %

## 2020-10-28 ENCOUNTER — APPOINTMENT (OUTPATIENT)
Dept: CT IMAGING | Age: 65
End: 2020-10-28
Payer: MEDICAID

## 2020-10-28 ENCOUNTER — APPOINTMENT (OUTPATIENT)
Dept: GENERAL RADIOLOGY | Age: 65
End: 2020-10-28
Payer: MEDICAID

## 2020-10-28 ENCOUNTER — HOSPITAL ENCOUNTER (OUTPATIENT)
Age: 65
Setting detail: OBSERVATION
Discharge: HOME | End: 2020-10-29
Attending: EMERGENCY MEDICINE | Admitting: INTERNAL MEDICINE
Payer: MEDICAID

## 2020-10-28 LAB
ANION GAP SERPL CALCULATED.3IONS-SCNC: 10 MMOL/L (ref 3–16)
BASOPHILS ABSOLUTE: 0.1 K/UL (ref 0–0.2)
BASOPHILS RELATIVE PERCENT: 0.8 %
BILIRUBIN URINE: NEGATIVE
BLOOD, URINE: NEGATIVE
BUN BLDV-MCNC: 12 MG/DL (ref 7–20)
CALCIUM SERPL-MCNC: 10.2 MG/DL (ref 8.3–10.6)
CHLORIDE BLD-SCNC: 103 MMOL/L (ref 99–110)
CLARITY: CLEAR
CO2: 28 MMOL/L (ref 21–32)
COLOR: YELLOW
CREAT SERPL-MCNC: 0.9 MG/DL (ref 0.6–1.2)
EKG ATRIAL RATE: 85 BPM
EKG DIAGNOSIS: NORMAL
EKG P AXIS: 47 DEGREES
EKG P-R INTERVAL: 156 MS
EKG Q-T INTERVAL: 384 MS
EKG QRS DURATION: 80 MS
EKG QTC CALCULATION (BAZETT): 456 MS
EKG R AXIS: -16 DEGREES
EKG T AXIS: 35 DEGREES
EKG VENTRICULAR RATE: 85 BPM
EOSINOPHILS ABSOLUTE: 0.2 K/UL (ref 0–0.6)
EOSINOPHILS RELATIVE PERCENT: 3.8 %
GFR AFRICAN AMERICAN: >60
GFR NON-AFRICAN AMERICAN: >60
GLUCOSE BLD-MCNC: 105 MG/DL (ref 70–99)
GLUCOSE URINE: NEGATIVE MG/DL
HCT VFR BLD CALC: 38.7 % (ref 36–48)
HEMOGLOBIN: 12.8 G/DL (ref 12–16)
KETONES, URINE: NEGATIVE MG/DL
LEUKOCYTE ESTERASE, URINE: NEGATIVE
LYMPHOCYTES ABSOLUTE: 3.1 K/UL (ref 1–5.1)
LYMPHOCYTES RELATIVE PERCENT: 48.1 %
MCH RBC QN AUTO: 29.2 PG (ref 26–34)
MCHC RBC AUTO-ENTMCNC: 33 G/DL (ref 31–36)
MCV RBC AUTO: 88.6 FL (ref 80–100)
MICROSCOPIC EXAMINATION: NORMAL
MONOCYTES ABSOLUTE: 0.5 K/UL (ref 0–1.3)
MONOCYTES RELATIVE PERCENT: 7.2 %
NEUTROPHILS ABSOLUTE: 2.6 K/UL (ref 1.7–7.7)
NEUTROPHILS RELATIVE PERCENT: 40.1 %
NITRITE, URINE: NEGATIVE
PDW BLD-RTO: 14.2 % (ref 12.4–15.4)
PH UA: 6 (ref 5–8)
PLATELET # BLD: 264 K/UL (ref 135–450)
PMV BLD AUTO: 8.2 FL (ref 5–10.5)
POTASSIUM REFLEX MAGNESIUM: 4 MMOL/L (ref 3.5–5.1)
PRO-BNP: 75 PG/ML (ref 0–124)
PROTEIN UA: NEGATIVE MG/DL
RBC # BLD: 4.36 M/UL (ref 4–5.2)
SODIUM BLD-SCNC: 141 MMOL/L (ref 136–145)
SPECIFIC GRAVITY UA: 1.01 (ref 1–1.03)
TROPONIN: <0.01 NG/ML
URINE TYPE: NORMAL
UROBILINOGEN, URINE: 0.2 E.U./DL
WBC # BLD: 6.4 K/UL (ref 4–11)

## 2020-10-28 PROCEDURE — G0378 HOSPITAL OBSERVATION PER HR: HCPCS

## 2020-10-28 PROCEDURE — 84484 ASSAY OF TROPONIN QUANT: CPT

## 2020-10-28 PROCEDURE — 99285 EMERGENCY DEPT VISIT HI MDM: CPT

## 2020-10-28 PROCEDURE — 71045 X-RAY EXAM CHEST 1 VIEW: CPT

## 2020-10-28 PROCEDURE — 70450 CT HEAD/BRAIN W/O DYE: CPT

## 2020-10-28 PROCEDURE — 80048 BASIC METABOLIC PNL TOTAL CA: CPT

## 2020-10-28 PROCEDURE — 83880 ASSAY OF NATRIURETIC PEPTIDE: CPT

## 2020-10-28 PROCEDURE — 81003 URINALYSIS AUTO W/O SCOPE: CPT

## 2020-10-28 PROCEDURE — 85025 COMPLETE CBC W/AUTO DIFF WBC: CPT

## 2020-10-28 PROCEDURE — 93005 ELECTROCARDIOGRAM TRACING: CPT | Performed by: EMERGENCY MEDICINE

## 2020-10-28 RX ORDER — CYCLOBENZAPRINE HCL 5 MG
5 TABLET ORAL 2 TIMES DAILY PRN
COMMUNITY
End: 2021-11-11

## 2020-10-28 RX ORDER — ATORVASTATIN CALCIUM 40 MG/1
40 TABLET, FILM COATED ORAL NIGHTLY
Status: DISCONTINUED | OUTPATIENT
Start: 2020-10-29 | End: 2020-10-29 | Stop reason: HOSPADM

## 2020-10-28 RX ORDER — ONDANSETRON 2 MG/ML
4 INJECTION INTRAMUSCULAR; INTRAVENOUS EVERY 6 HOURS PRN
Status: DISCONTINUED | OUTPATIENT
Start: 2020-10-28 | End: 2020-10-29 | Stop reason: HOSPADM

## 2020-10-28 RX ORDER — LOSARTAN POTASSIUM AND HYDROCHLOROTHIAZIDE 12.5; 5 MG/1; MG/1
1 TABLET ORAL DAILY
Status: DISCONTINUED | OUTPATIENT
Start: 2020-10-29 | End: 2020-10-29 | Stop reason: HOSPADM

## 2020-10-28 RX ORDER — PROMETHAZINE HYDROCHLORIDE 12.5 MG/1
12.5 TABLET ORAL EVERY 6 HOURS PRN
Status: DISCONTINUED | OUTPATIENT
Start: 2020-10-28 | End: 2020-10-29 | Stop reason: HOSPADM

## 2020-10-28 RX ORDER — SODIUM CHLORIDE 0.9 % (FLUSH) 0.9 %
10 SYRINGE (ML) INJECTION PRN
Status: DISCONTINUED | OUTPATIENT
Start: 2020-10-28 | End: 2020-10-29 | Stop reason: HOSPADM

## 2020-10-28 RX ORDER — SODIUM CHLORIDE 0.9 % (FLUSH) 0.9 %
10 SYRINGE (ML) INJECTION EVERY 12 HOURS SCHEDULED
Status: DISCONTINUED | OUTPATIENT
Start: 2020-10-28 | End: 2020-10-29 | Stop reason: HOSPADM

## 2020-10-28 RX ORDER — POLYETHYLENE GLYCOL 3350 17 G/17G
17 POWDER, FOR SOLUTION ORAL DAILY PRN
Status: DISCONTINUED | OUTPATIENT
Start: 2020-10-28 | End: 2020-10-29 | Stop reason: HOSPADM

## 2020-10-28 ASSESSMENT — ENCOUNTER SYMPTOMS
SHORTNESS OF BREATH: 0
ABDOMINAL PAIN: 0
CHEST TIGHTNESS: 0
BACK PAIN: 0
COLOR CHANGE: 0
VOMITING: 0
NAUSEA: 0

## 2020-10-28 ASSESSMENT — PAIN SCALES - GENERAL: PAINLEVEL_OUTOF10: 0

## 2020-10-28 NOTE — ED PROVIDER NOTES
ED Attending Attestation Note     Date of evaluation: 10/28/2020    This patient was seen by the advance practice provider. I have seen and examined the patient, agree with the workup, evaluation, management and diagnosis. The care plan has been discussed. I have reviewed the ECG and concur with the RUSSELL's interpretation. My assessment reveals a 60-year-old female who presents with chief complaint of numbness. Patient with right-sided facial discomfort yesterday and earlier this morning. States her symptoms have resolved. Normal neurologic exam with normal cranial nerves. Lavina Halsted, MD  10/28/20 1600 99

## 2020-10-28 NOTE — ED PROVIDER NOTES
sounds: Normal breath sounds. Abdominal:      Tenderness: There is no abdominal tenderness. Musculoskeletal:      Right lower leg: No edema. Left lower leg: No edema. Neurological:      Mental Status: She is alert. Comments: AOx4, able to follow commands; normal speech with no aphasia and no dysarthria; no facial asymmetry; Visual fields intact bilaterally, EOMI bilaterally; no pronator drift of the upper extremities bilaterally; she has 5/5  strength bilaterally and can push and pull with full strength and no asymmetry. No motor drift of the lower extremities bilaterally. Normal finger-to-nose bilaterally, normal gait without ataxia. Normal sensation to soft touch in the upper and lower extremities bilaterally. Psychiatric:         Mood and Affect: Mood normal.         Behavior: Behavior normal.         Diagnostic Results     EKG   Interpreted in conjunction with emergency department physician Obdulia Leonardo MD  Rhythm: normal sinus   Rate: normal  Axis: left  Ectopy: premature ventricular contractions (multifocal)  Conduction: normal  ST Segments: normal  T Waves: normal  Q Waves:none  Clinical Impression: Normal sinus rhythm with PVCs in bigeminy pattern    RADIOLOGY:  XR CHEST PORTABLE   Final Result      Clear lungs. Normal cardiomediastinal silhouette. CT Head WO Contrast   Final Result      1. No acute intracranial hemorrhage or mass effect. 2.  White matter mild chronic small vessel ischemia.       MRI BRAIN WO CONTRAST    (Results Pending)     LABS:   Results for orders placed or performed during the hospital encounter of 10/28/20   CBC Auto Differential   Result Value Ref Range    WBC 6.4 4.0 - 11.0 K/uL    RBC 4.36 4.00 - 5.20 M/uL    Hemoglobin 12.8 12.0 - 16.0 g/dL    Hematocrit 38.7 36.0 - 48.0 %    MCV 88.6 80.0 - 100.0 fL    MCH 29.2 26.0 - 34.0 pg    MCHC 33.0 31.0 - 36.0 g/dL    RDW 14.2 12.4 - 15.4 %    Platelets 306 760 - 860 K/uL    MPV 8.2 5.0 - 10.5 fL Neutrophils % 40.1 %    Lymphocytes % 48.1 %    Monocytes % 7.2 %    Eosinophils % 3.8 %    Basophils % 0.8 %    Neutrophils Absolute 2.6 1.7 - 7.7 K/uL    Lymphocytes Absolute 3.1 1.0 - 5.1 K/uL    Monocytes Absolute 0.5 0.0 - 1.3 K/uL    Eosinophils Absolute 0.2 0.0 - 0.6 K/uL    Basophils Absolute 0.1 0.0 - 0.2 K/uL   Basic Metabolic Panel w/ Reflex to MG   Result Value Ref Range    Sodium 141 136 - 145 mmol/L    Potassium reflex Magnesium 4.0 3.5 - 5.1 mmol/L    Chloride 103 99 - 110 mmol/L    CO2 28 21 - 32 mmol/L    Anion Gap 10 3 - 16    Glucose 105 (H) 70 - 99 mg/dL    BUN 12 7 - 20 mg/dL    CREATININE 0.9 0.6 - 1.2 mg/dL    GFR Non-African American >60 >60    GFR African American >60 >60    Calcium 10.2 8.3 - 10.6 mg/dL   Troponin   Result Value Ref Range    Troponin <0.01 <0.01 ng/mL   Brain Natriuretic Peptide   Result Value Ref Range    Pro-BNP 75 0 - 124 pg/mL   Urinalysis, reflex to microscopic   Result Value Ref Range    Color, UA Yellow Straw/Yellow    Clarity, UA Clear Clear    Glucose, Ur Negative Negative mg/dL    Bilirubin Urine Negative Negative    Ketones, Urine Negative Negative mg/dL    Specific Gravity, UA 1.010 1.005 - 1.030    Blood, Urine Negative Negative    pH, UA 6.0 5.0 - 8.0    Protein, UA Negative Negative mg/dL    Urobilinogen, Urine 0.2 <2.0 E.U./dL    Nitrite, Urine Negative Negative    Leukocyte Esterase, Urine Negative Negative    Microscopic Examination Not Indicated     Urine Type Voided    EKG 12 Lead   Result Value Ref Range    Ventricular Rate 85 BPM    Atrial Rate 85 BPM    P-R Interval 156 ms    QRS Duration 80 ms    Q-T Interval 384 ms    QTc Calculation (Bazett) 456 ms    P Axis 47 degrees    R Axis -16 degrees    T Axis 35 degrees    Diagnosis       EKG performed in ER and to be interpreted by ER physician. Confirmed by MD, ER ([id number]),  Adam Roberto ([phone]) on 10/28/2020 6:28:56 PM       ED BEDSIDE ULTRASOUND:  None    RECENT VITALS:  BP: 129/84,  , Pulse: 67,  , SpO2: 100 %     Procedures   None    ED Course     Nursing Notes, Past Medical Hx,Past Surgical Hx, Social Hx, Allergies, and Family Hx were reviewed. The patient was given the following medications:  No orders of the defined types were placed in this encounter. CONSULTS:  IP CONSULT TO PRIMARY CARE PROVIDER  IP CONSULT TO HOSPITALIST    MEDICAL DECISION MAKING / ASSESSMENT / Brandi Leo is a 60 y.o. female presenting to the emergency department for evaluation of reported slurred speech with change in sensation on the right side of her face that occurred yesterday and then again today. Patient's neighbor was concerned about symptoms, initially the patient herself stating that this was more of a soreness in her cheek, but after speaking with her daughter she was concerned about possible TIA. She has been admitted for TIAs in similar distributions in the past, the most recent was in December. EKG completed showing normal sinus rhythm with frequent PVCs what appeared to be in a bigeminy pattern. He had a normal neuro exam, NIH score of 0. Labs completed including CBC, BMP, troponin, BNP and urinalysis which were all normal.  CT head completed showing no acute intracranial hemorrhage or mass-effect with chronic small vessel ischemia. After discussion with the patient, she is in agreement with plan for admission to the hospital for further evaluation concern of TIA. She will be monitored here in the emergency department until she is moved to her new treatment location. This patient was also evaluated by the attending physician. All care plans were discussed and agreed upon. Clinical Impression     1. TIA (transient ischemic attack)        Disposition     PATIENT REFERRED TO:  No follow-up provider specified.     DISCHARGE MEDICATIONS:  New Prescriptions    No medications on file       DISPOSITION Admitted 10/28/2020 07:46:25 PM       Shahana Parsons PA-C  10/28/20 1948

## 2020-10-28 NOTE — H&P
Hospital Medicine History & Physical      PCP: NOHEMY Vallecillo    Date of Admission: 10/28/2020    Date of Service: Pt seen/examined on 10/28/2020. Placed in Observation. Chief Complaint: Right-sided facial numbness and droop      History Of Present Illness:     60 y.o. female who presents with complaints of right-sided facial numbness/discomfort. Patient has felt some soreness in the right side of her face yesterday which lasted for few minutes and again today. Patient is pointing towards right side of the face just below the right eye and lateral to the right corner of the mouth and states that is where she felt the discomfort. Patient states that this happens every time she gets into an argument with somebody. According to patient's daughter and the neighbor who called EMS has witnessed patient was having right-sided facial droop and slurred speech. By the time she arrived in ED all her symptoms have resolved. Last known normal-unknown, most likely after 4 PM according to her neighbor. Patient denies fever, chills, headache, nausea, vomiting, difficulty swallowing, changes in vision or other focal sensory or motor deficits. Upon reviewing her medical record she has had multiple TIAs, was hospitalized in December 2019 for similar complaints. TIA/CVA work-up at that time was negative. Past Medical History:          Diagnosis Date    Anemia     Arthritis of knee     CVA (cerebral vascular accident) ([address]     left side weakness    Heart disease     ?  TYPE    Hypertension     Tobacco dependence        Past Surgical History:          Procedure Laterality Date    JOINT REPLACEMENT Right     Right knee    MT CORRJ HALLUX VALGUS W/SESMDC W/2 OSTEOT Left 10/26/2018    MODIFIED YEN BUNIONECTOMY WITH FIRST METATARSAL OSTEOTOMY WITH  ANNALISA OSTEOTOMY, LEFT FOOT performed by Tan Rodriguez DPM at [facility] Medications Prior to Admission:      Prior to Admission medications    Medication Sig Start Date End Date Taking? Authorizing Provider   calcium carbonate (OSCAL) 500 MG TABS tablet Take 500 mg by mouth daily   Yes Historical Provider, MD   aspirin 325 MG EC tablet Take 1 tablet by mouth daily 8/27/18 10/28/20 Yes Baldev Rich MD   atorvastatin (LIPITOR) 40 MG tablet Take 1 tablet by mouth nightly 8/27/18  Yes Baldev Rich MD   losartan-hydrochlorothiazide (HYZAAR) 50-12.5 MG per tablet Take 1 tablet by mouth daily   Yes Historical Provider, MD   potassium chloride (K-TAB) 10 MEQ extended release tablet Take 10 mEq by mouth daily   Yes Historical Provider, MD   Cholecalciferol (VITAMIN D3) 2000 UNITS CAPS Take 1,000 Units by mouth daily    Yes Historical Provider, MD   albuterol (PROVENTIL HFA;VENTOLIN HFA) 108 (90 BASE) MCG/ACT inhaler Inhale 2 puffs into the lungs every 6 hours as needed for Wheezing or Shortness of Breath. Yes Historical Provider, MD   lidocaine (LIDODERM) 5 % Place 1 patch onto the skin daily 12 hours on, 12 hours off. 11/28/18   Dee Baumgarten, PA-C   nicotine (NICODERM CQ) 14 MG/24HR Place 1 patch onto the skin every 24 hours 8/27/18 8/27/19  Baldev Rich MD   metFORMIN (GLUCOPHAGE) 500 MG tablet Take 500 mg by mouth daily (with breakfast)    Historical Provider, MD       Allergies:  Codeine; Celebrex [celecoxib]; Clopidogrel; and Gabapentin    Social History:    TOBACCO:   reports that she has been smoking cigarettes. She has a 5.50 pack-year smoking history. She has never used smokeless tobacco.  ETOH:   reports current alcohol use. E-Cigarettes Vaping or Juuling     Questions Responses    Vaping Use Never User    Start Date     Does device contain nicotine? Quit Date     Vaping Type         Family History:    Reviewed in detail and negative for DM, CAD, Cancer, CVA.  Positive as follows:        Problem Relation Age of Onset    Cancer Mother         LUNG    Heart Disease Father     High Blood Pressure Father     Diabetes Father     High Blood Pressure Brother     Stroke Paternal Grandmother        REVIEW OF SYSTEMS:   Pertinent positives as noted in the HPI. All other systems reviewed and negative. PHYSICAL EXAM PERFORMED:    /84   Pulse 67   SpO2 100%     General appearance:  No apparent distress, appears stated age and cooperative. Obese. Pleasant  HEENT:  Normal cephalic, atraumatic without obvious deformity. Pupils equal, round, and reactive to light. Extra ocular muscles intact. Conjunctivae/corneas clear. Neck: Supple, with full range of motion. No jugular venous distention. Trachea midline. Respiratory:  Normal respiratory effort. Clear to auscultation, bilaterally without Rales/Wheezes/Rhonchi. Cardiovascular:  Regular rate and rhythm with normal S1/S2 without murmurs, rubs or gallops. Abdomen: Soft, non-tender, non-distended with normal bowel sounds. Musculoskeletal:  No clubbing, cyanosis or edema bilaterally. Full range of motion without deformity. Skin: Skin color, texture, turgor normal.  No rashes or lesions. Neurologic:  Neurovascularly intact without any focal sensory/motor deficits. Cranial nerves: II-XII intact, grossly non-focal.  No facial asymmetry, no sensory deficits on the face, speech clear. Psychiatric:  Alert and oriented, thought content appropriate, normal insight  Capillary Refill: Brisk,< 3 seconds   Peripheral Pulses: +2 palpable, equal bilaterally       Labs:     Recent Labs     10/28/20  1628   WBC 6.4   HGB 12.8   HCT 38.7        Recent Labs     10/28/20  1628      K 4.0      CO2 28   BUN 12   CREATININE 0.9   CALCIUM 10.2     No results for input(s): AST, ALT, BILIDIR, BILITOT, ALKPHOS in the last 72 hours. No results for input(s): INR in the last 72 hours.   Recent Labs     10/28/20  1628   TROPONINI <0.01       Urinalysis:      Lab Results   Component Value Date    NITRU Negative 10/28/2020    WBCUA 2 08/26/2018    BACTERIA 1+ 08/26/2018    RBCUA 1 08/26/2018    RBCUA NEGATIVE 01/13/2017    BLOODU Negative 10/28/2020    SPECGRAV 1.010 10/28/2020    GLUCOSEU Negative 10/28/2020       Radiology:   EKG:  I have reviewed the EKG with the following interpretation: Sinus rhythm, VR = 85, normal intervals, QTc = 456, occasional PVCs, no acute ST-T changes    XR CHEST PORTABLE   Final Result      Clear lungs. Normal cardiomediastinal silhouette. CT Head WO Contrast   Final Result      1. No acute intracranial hemorrhage or mass effect. 2.  White matter mild chronic small vessel ischemia. MRI BRAIN WO CONTRAST    (Results Pending)       ASSESSMENT:    Active Hospital Problems    Diagnosis Date Noted    TIA (transient ischemic attack) [G45.9] 09/08/2017   - Prior history of multiple TIAs  - Essential hypertension  - Obesity with BMI of 36    PLAN:  Continue aspirin and statins  Neurochecks every 4 hourly  MRI brain without contrast  Check lipid panel and A1c  Patient has had echocardiogram with bubble study and carotid ultrasound in December 2019 which were normal.  Therefore we will not repeat these studies during this hospitalization  PT/OT/SLP as indicated  Continue her home medications appropriately  Check magnesium levels  Supportive therapy    DVT Prophylaxis: Lovenox  Diet: DIET NO SALT ADDED (3-4 GM); Low Sodium (2 GM)  Code Status: Full Code    PT/OT Eval Status: As tolerated    Dispo -PCU with telemetry       Claudine Bloch, MD    Thank you NOHEMY Jones for the opportunity to be involved in this patient's care. If you have any questions or concerns please feel free to contact me at [phone].

## 2020-10-28 NOTE — ED NOTES
Patient tearful about staying in the hospital and getting an MRI but was reassured easily by this RN. MRI questionnaire completed with the patient and faxed to MRI. Patient also given a  and some snacks. Patient denies any further needs at this time.          Pierce Ulloa RN  10/28/20 1943

## 2020-10-29 ENCOUNTER — APPOINTMENT (OUTPATIENT)
Dept: MRI IMAGING | Age: 65
End: 2020-10-29
Payer: MEDICAID

## 2020-10-29 VITALS
BODY MASS INDEX: 35.79 KG/M2 | SYSTOLIC BLOOD PRESSURE: 129 MMHG | OXYGEN SATURATION: 99 % | WEIGHT: 228 LBS | TEMPERATURE: 98 F | HEART RATE: 71 BPM | HEIGHT: 67 IN | RESPIRATION RATE: 16 BRPM | DIASTOLIC BLOOD PRESSURE: 84 MMHG

## 2020-10-29 LAB
CHOLESTEROL, TOTAL: 194 MG/DL (ref 0–199)
HDLC SERPL-MCNC: 63 MG/DL (ref 40–60)
LDL CHOLESTEROL CALCULATED: 112 MG/DL
MAGNESIUM: 2.2 MG/DL (ref 1.8–2.4)
TRIGL SERPL-MCNC: 94 MG/DL (ref 0–150)
VLDLC SERPL CALC-MCNC: 19 MG/DL

## 2020-10-29 PROCEDURE — 80061 LIPID PANEL: CPT

## 2020-10-29 PROCEDURE — 90686 IIV4 VACC NO PRSV 0.5 ML IM: CPT | Performed by: INTERNAL MEDICINE

## 2020-10-29 PROCEDURE — G0008 ADMIN INFLUENZA VIRUS VAC: HCPCS | Performed by: INTERNAL MEDICINE

## 2020-10-29 PROCEDURE — 70551 MRI BRAIN STEM W/O DYE: CPT

## 2020-10-29 PROCEDURE — 83036 HEMOGLOBIN GLYCOSYLATED A1C: CPT

## 2020-10-29 PROCEDURE — 6360000002 HC RX W HCPCS: Performed by: INTERNAL MEDICINE

## 2020-10-29 PROCEDURE — 6370000000 HC RX 637 (ALT 250 FOR IP): Performed by: INTERNAL MEDICINE

## 2020-10-29 PROCEDURE — 96372 THER/PROPH/DIAG INJ SC/IM: CPT

## 2020-10-29 PROCEDURE — 2580000003 HC RX 258: Performed by: INTERNAL MEDICINE

## 2020-10-29 PROCEDURE — 36415 COLL VENOUS BLD VENIPUNCTURE: CPT

## 2020-10-29 PROCEDURE — G0378 HOSPITAL OBSERVATION PER HR: HCPCS

## 2020-10-29 PROCEDURE — 83735 ASSAY OF MAGNESIUM: CPT

## 2020-10-29 RX ORDER — ATORVASTATIN CALCIUM 40 MG/1
40 TABLET, FILM COATED ORAL NIGHTLY
Qty: 30 TABLET | Refills: 3 | Status: SHIPPED | OUTPATIENT
Start: 2020-10-29 | End: 2020-11-04 | Stop reason: SDUPTHER

## 2020-10-29 RX ORDER — LOSARTAN POTASSIUM AND HYDROCHLOROTHIAZIDE 12.5; 5 MG/1; MG/1
1 TABLET ORAL DAILY
Qty: 30 TABLET | Refills: 3 | Status: SHIPPED | OUTPATIENT
Start: 2020-10-29 | End: 2022-02-24 | Stop reason: SDUPTHER

## 2020-10-29 RX ADMIN — ATORVASTATIN CALCIUM 40 MG: 40 TABLET, FILM COATED ORAL at 00:48

## 2020-10-29 RX ADMIN — Medication 10 ML: at 11:55

## 2020-10-29 RX ADMIN — ENOXAPARIN SODIUM 40 MG: 40 INJECTION SUBCUTANEOUS at 11:54

## 2020-10-29 RX ADMIN — Medication 10 ML: at 01:35

## 2020-10-29 RX ADMIN — INFLUENZA A VIRUS A/VICTORIA/2454/2019 IVR-207 (H1N1) ANTIGEN (PROPIOLACTONE INACTIVATED), INFLUENZA A VIRUS A/HONG KONG/2671/2019 IVR-208 (H3N2) ANTIGEN (PROPIOLACTONE INACTIVATED), INFLUENZA B VIRUS B/VICTORIA/705/2018 BVR-11 ANTIGEN (PROPIOLACTONE INACTIVATED), INFLUENZA B VIRUS B/PHUKET/3073/2013 BVR-1B ANTIGEN (PROPIOLACTONE INACTIVATED) 0.5 ML: 15; 15; 15; 15 INJECTION, SUSPENSION INTRAMUSCULAR at 13:46

## 2020-10-29 ASSESSMENT — PAIN SCALES - GENERAL
PAINLEVEL_OUTOF10: 0
PAINLEVEL_OUTOF10: 0

## 2020-10-29 NOTE — ED NOTES
Report called to Asha Bahena RN on [facility]. Patient will be transported now.      Ashley Llamas RN  10/28/20 1414

## 2020-10-29 NOTE — CARE COORDINATION
Case Management            Discharge Note                    Date / Time of Note: 10/29/2020 1:37 PM                  Discharge Note Completed by: Yanet Albert Day    Patient Name: Leighton Suarez   YOB: 1955  Diagnosis: TIA (transient ischemic attack) [G45.9]   Date / Time: 10/28/2020  4:00 PM    Current PCP: GERRI EDWARDS, [address] [place] [address] patient: No    Hospitalization in the last 30 days: No    Advance Directives:  Code Status: Full Code  [place] DNR form completed and on chart: Not Indicated    Financial:  Payor: Aga Lara / Plan: Shira Ventura / Product Type: *No Product type* /      Pharmacy:    [address] [place] [id number]  Phone: [phone] Fax: [phone]      Assistance purchasing medications?: Potential Assistance Purchasing Medications: No  Assistance provided by Case Management: None at this time    Does patient want to participate in local refill/ meds to beds program?: Not Assessed    Meds To Beds General Rules:  1. Can ONLY be done Monday- Friday between 8:30am-5pm  2. Prescription(s) must be in pharmacy by 3pm to be filled same day  3. Copy of patient's insurance/ prescription drug card and patient face sheet must be sent along with the prescription(s)  4. Cost of Rx cannot be added to hospital bill. If financial assistance is needed, please contact unit  or ;  or  CANNOT provide pharmacy voucher for patients co-pays  5.  Patients can then  the prescription on their way out of the hospital at discharge, or pharmacy can deliver to the bedside if staff is available. (payment due at time of pick-up or delivery - cash, check, or card accepted)     Able to afford home medications/ co-pay costs: No    ADLS:  Current PT AM-PAC Score:   /24  Current OT AM-PAC Score:   /24      Discharge Disposition: Home- No Services Needed    LOC at discharge: Not Applicable  HECTOR Completed: Not Indicated    Notification completed in HENS/PAS?:  No    IMM Completed:   No    Transportation:  Transportation Plan for discharge: [facility]    Mode of Transport: Lyft  Transport Time: Immediate     Transportation form completed: Not Indicated    Home Care:  Home Care ordered at discharge: Not Indicated    Durable Medical Equipment:  DME Provider: No DME   Equipment obtained during hospitalization: No new DME. Home Oxygen and Respiratory Equipment:  Oxygen needed at discharge?: Not Indicated    Dialysis:  Dialysis patient: No    Dialysis Center:  Not Applicable    Referrals made at [facility] for outpatient continued care:  Not Applicable    Additional CM Notes:   SW met with patient at bedside. Patient has been medically cleared for discharge today. Patient is from home alone at baseline. She has no new home care needs. Patient to follow up outpatient. Patient reported her sister's care broke down and unable to assist. SW to assist through [employer]. Patent to get home by 3:00 pm to gain entrance to building and apartment. SW placed call to [facility] DR KRISTEN CASAREZ [phone] at 1:39 pm. They will call Bedside RN upon arrival. Trip number is [phone]    No further needs at this time,     The Plan for Transition of Care is related to the following treatment goals TIA (transient ischemic attack) [G45.9]      The Patient and/or patient representative Patient  was provided with a choice of provider and agrees with the discharge plan Yes    Freedom of choice list was provided with basic dialogue that supports the patient's individualized plan of care/goals and shares the quality data associated with the providers.  Yes    Care Transitions patient: No    Hiram Martinez, MSW  [facility]   Case Management Department  Ph: [phone]

## 2020-10-29 NOTE — PROGRESS NOTES
Patient needs a 2 week Zio patch and then she needs an appmt as a new patient. It can be with me or TALHA.

## 2020-10-29 NOTE — PROGRESS NOTES
4 Eyes Admission Assessment     I agree as the admission nurse that 2 RN's have performed a thorough Head to Toe Skin Assessment on the patient. ALL assessment sites listed below have been assessed on admission. Areas assessed by both nurses:   [x]   Head, Face, and Ears   [x]   Shoulders, Back, and Chest  [x]   Arms, Elbows, and Hands   [x]   Coccyx, Sacrum, and Ischium  [x]   Legs, Feet, and Heels        Does the Patient have Skin Breakdown? No, intact.          Wander Prevention initiated:  Yes   Wound Care Orders initiated:  NA      [facility] nurse consulted for Pressure Injury (Stage 3,4, Unstageable, DTI, NWPT, and Complex wounds) or Wander score 18 or lower:  No      Nurse 1 eSignature: Electronically signed by Catracho Smith RN on 10/29/20 at 1:34 AM EDT    **SHARE this note so that the co-signing nurse is able to place an eSignature**    Nurse 2 eSignature: Electronically signed by Toro Killian RN on 10/29/20 at 12:06 PM EDT

## 2020-10-29 NOTE — PROGRESS NOTES
ADMITTED TO 5 TOWER.VSS. A/Ox4. [facility] 0. Up to chair. SBA. MRI checklist faxed. Bed alarm set. Call light in reach. Will continue to monitor. Bedside swallow passed. Diet advanced.

## 2020-10-29 NOTE — PROGRESS NOTES
Speech Language Pathology  Attempt Note    Received order for speech evaluation. Reviewed chart, spoke with RN and patient. CT scan negative for ICH, awaiting MRI. Pt tolerating diet well, speech deficits resolved; pt declining evaluation at this time.      Jerry Rodriguez M.A., [id number] [facility].[id number]  Speech-Language Pathologist  Pager: [phone]

## 2020-10-29 NOTE — PROGRESS NOTES
Stroke Admission    I agree as the admission nurse that I have completed a thorough stroke assessment and completed the admission on the patient. ALL assessment areas listed below have been addressed and completed. Presentation:TIA    Handoff assessment completed with ,RN. Current NIHSS 0.     [x]   Education Assessment  [x]   Individualized Stroke/TIA Education template added, including patient specific risk factors: Personal history of previous Stroke/TIA  [x]   Individualized Stroke/TIA Care Plan template added  [x]   Swallow screen completed using the [employer] Protocol, and documented on flowsheet PRIOR to oral intake: Pass  [x]   VTE Prophylaxis ordered/addressed; SCDs:   [x]   Stroke education booklet given, and education initiated with patient and/or caregiver      Nurse eSignature: Electronically signed by Juan Grijalva RN on 10/28/20 at 10:32 PM EDT

## 2020-10-29 NOTE — PLAN OF CARE
Problem: HEMODYNAMIC STATUS  Goal: Patient has stable vital signs and fluid balance  10/29/2020 1159 by Angel Ty RN  Outcome: Ongoing   VSS. Tolerating fluids. Problem: ACTIVITY INTOLERANCE/IMPAIRED MOBILITY  Goal: Mobility/activity is maintained at optimum level for patient  Outcome: Ongoing  Ambulating without aides at this time, tolerating well. No further needs at this time. Problem: COMMUNICATION IMPAIRMENT  Goal: Ability to express needs and understand communication  Outcome: Ongoing  Communicates clearly needs. No deficits at this time. Will continue to monitor.

## 2020-10-29 NOTE — PROGRESS NOTES
Physical Therapy/Occupational Therapy   Discharge  Spoke with Dr. Leon Chambers- reports pt is at her baseline & no need for PT/OT evals. Will sign off. Shelli Dumont, PT [id number]  YOLANDE Saxena, [address] [place] [address]

## 2020-10-29 NOTE — PROGRESS NOTES
Occupational Therapy/Physical Therapy  Referrals received, chart reviewed. Pt is currently off the unit for MRI. Will follow up later today as schedule permits and pt is available. YOLANDE Granados, OTR/L [id number]  [facility], [facility]

## 2020-10-30 LAB
ESTIMATED AVERAGE GLUCOSE: 131.2 MG/DL
HBA1C MFR BLD: 6.2 %

## 2020-10-30 NOTE — PROGRESS NOTES
Discharge order received. Patient informed of discharge order. Discharge instructions reviewed with patient. Copy of discharge instructions given to patient. Patient  verbalized understanding, denies needs or questions at this time. IV and telemetry removed. All patient belongings packed and sent with patient upon discharge. Patient left in wheelchair to [facility] transportation (arranged by case management) for transportation to her primary residence.

## 2020-11-01 NOTE — DISCHARGE SUMMARY
Hospital Medicine Discharge Summary    Patient: Pliar Palmer     Gender: female  : 1955   Age: 60 y.o. MRN: [id number]    Admitting Physician: Zhao Henao MD  Discharge Physician: Billy Frank DO    Code Status: Prior     Admit Date: 10/28/2020   Discharge Date: 10/29/2020      Disposition:  Home    Discharge Diagnoses: Active Hospital Problems    Diagnosis Date Noted    TIA (transient ischemic attack) [G45.9] 2017       Outpatient to do list:     1) Follow-up appointments:    Primary care provider  Cardiology - outpatient monitor     Condition at Discharge: Stable    Hospital Course:   60 y.o. female who presents with complaints of right-sided facial numbness/discomfort. Patient has felt some soreness in the right side of her face yesterday which lasted for few minutes and again today. Patient is pointing towards right side of the face just below the right eye and lateral to the right corner of the mouth and states that is where she felt the discomfort. Patient states that this happens every time she gets into an argument with somebody. According to patient's daughter and the neighbor who called EMS has witnessed patient was having right-sided facial droop and slurred speech. By the time she arrived in ED all her symptoms have resolved. Last known normal-unknown, most likely after 4 PM according to her neighbor. Patient denies fever, chills, headache, nausea, vomiting, difficulty swallowing, changes in vision or other focal sensory or motor deficits. Upon reviewing her medical record she has had multiple TIAs, was hospitalized in 2019 for similar complaints. TIA/CVA work-up at that time was negative. Patient underwent MRI which was negative. Patient has had echocardiogram with bubble study and carotid ultrasound in 2019 which were normal. PT/OT/SLP were ok. Sinus rhythm on telemetry.  Patient insisted on being discharged because of a personal INR 0.92 08/26/2018       Radiology:  Ct Head Wo Contrast    Result Date: 10/28/2020  CT HEAD WO CONTRAST Indication: RIGHT facial paresthesia, resolved Comparison: MRI brain 4/12/19 Technique:  CT head was performed without intravenous contrast. Coronal and sagittal reformations were created. Up-to-date CT equipment and radiation dose reduction techniques were employed. Findings: No evidence of acute intracranial hemorrhage or extra-axial fluid collection. No mass effect or midline shift. Gray-white matter differentiation is normal. Mild hypoattenuation periventricular white matter suggests chronic small vessel ischemia. Ventricles and sulci are appropriate for the patient's age. Basal cisterns are patent. Mild atherosclerotic calcification in the distal internal carotid arteries. Calvarium is intact. Visualized paranasal sinuses and mastoid air cells are clear. 1.  No acute intracranial hemorrhage or mass effect. 2.  White matter mild chronic small vessel ischemia. Xr Chest Portable    Result Date: 10/28/2020  Chest portable 1647 HISTORY: Arrhythmia     Clear lungs. Normal cardiomediastinal silhouette. Mri Brain Wo Contrast    Result Date: 10/29/2020  Exam:MRI BRAIN WO CONTRAST Date:10/29/2020 10:47 AM Indication: TIA, recent right facial paresthesia Comparison: 10/28/2020 Technique: Multiplanar multisequence MR images obtained of the brain without intravenous contrast. Contrast:  None FINDINGS: The study is significantly limited secondary to patient agitation and refusal to continue. Only susceptibility weighted, diffusion weighted, and sagittal T1 images were obtained. No diffusion restriction, gross intracranial hemorrhage. No gross hydrocephalus. Partially empty sella. Significantly limited study as detailed. No evidence of recent infarct or gross intracranial hemorrhage.              The patient was seen and examined on day of discharge and this discharge summary is in conjunction with any daily progress note from day of discharge. Time Spent on discharge is more than 30 minutes in the examination, evaluation, counseling and review of medications and discharge plan. Lexi Meade DO   11/1/2020      Thank you LYNN GUTIÉRREZ CNP for the opportunity to be involved in this patient's care. If you have any questions or concerns please feel free to contact me at perfectserve.

## 2020-11-04 ENCOUNTER — TELEPHONE (OUTPATIENT)
Dept: CARDIOLOGY CLINIC | Age: 65
End: 2020-11-04

## 2020-11-04 ENCOUNTER — OFFICE VISIT (OUTPATIENT)
Dept: CARDIOLOGY CLINIC | Age: 65
End: 2020-11-04
Payer: MEDICAID

## 2020-11-04 VITALS
BODY MASS INDEX: 37.07 KG/M2 | TEMPERATURE: 97.1 F | HEART RATE: 76 BPM | SYSTOLIC BLOOD PRESSURE: 106 MMHG | WEIGHT: 236.2 LBS | HEIGHT: 67 IN | DIASTOLIC BLOOD PRESSURE: 62 MMHG

## 2020-11-04 PROCEDURE — 3044F HG A1C LEVEL LT 7.0%: CPT | Performed by: NURSE PRACTITIONER

## 2020-11-04 PROCEDURE — G8417 CALC BMI ABV UP PARAM F/U: HCPCS | Performed by: NURSE PRACTITIONER

## 2020-11-04 PROCEDURE — G8482 FLU IMMUNIZE ORDER/ADMIN: HCPCS | Performed by: NURSE PRACTITIONER

## 2020-11-04 PROCEDURE — G8427 DOCREV CUR MEDS BY ELIG CLIN: HCPCS | Performed by: NURSE PRACTITIONER

## 2020-11-04 PROCEDURE — 4004F PT TOBACCO SCREEN RCVD TLK: CPT | Performed by: NURSE PRACTITIONER

## 2020-11-04 PROCEDURE — 99204 OFFICE O/P NEW MOD 45 MIN: CPT | Performed by: NURSE PRACTITIONER

## 2020-11-04 PROCEDURE — 0296T PR EXT ECG > 48HR TO 21 DAY RCRD W/CONECT INTL RCRD: CPT | Performed by: INTERNAL MEDICINE

## 2020-11-04 PROCEDURE — 3017F COLORECTAL CA SCREEN DOC REV: CPT | Performed by: NURSE PRACTITIONER

## 2020-11-04 PROCEDURE — 2022F DILAT RTA XM EVC RTNOPTHY: CPT | Performed by: NURSE PRACTITIONER

## 2020-11-04 RX ORDER — ATORVASTATIN CALCIUM 40 MG/1
40 TABLET, FILM COATED ORAL NIGHTLY
Qty: 30 TABLET | Refills: 3 | Status: SHIPPED | OUTPATIENT
Start: 2020-11-04 | End: 2020-12-17 | Stop reason: SDUPTHER

## 2020-11-04 NOTE — PROGRESS NOTES
CC/HPI:    60 y.o. new patient with HTN, HLD, DM here for an 14 day zio monitor. She was recently  hospitalized with TIA with right sided facial numbness/discomfort. She's had hx of TIA in the past. He has not been compliant with medications. No chest pain, sob, LH/dizziness, palpitations or syncope. No LE edema, orthopnea or PND. No fever, chills, n/v/d or GI/ bleeding    No known hx A fib, CAD, CHF, liver, kidney or thyroid disease. No GI/ bleeding. She was a previous smoker but hasn't had a cigarette in 8 days. No family hx of premature heart disease. Past Medical History:   Diagnosis Date    Anemia     Arthritis of knee     CVA [facility]cerebral vascular accident) ([facility] [address]     left side weakness    Heart disease     ? TYPE    Hypertension     Tobacco dependence      Past Surgical History:   Procedure Laterality Date    JOINT REPLACEMENT Right     Right knee    OR CORRJ HALLUX VALGUS W/SESMDC W/2 OSTEOT Left 10/26/2018    MODIFIED YEN BUNIONECTOMY WITH FIRST METATARSAL OSTEOTOMY WITH  ANNALISA OSTEOTOMY, LEFT FOOT performed by Jody Marroquin DPM at [facility] OR    TONSILLECTOMY      TUBAL LIGATION      VENTRAL HERNIA REPAIR       Family History   Problem Relation Age of Onset    Cancer Mother         LUNG    Heart Disease Father     High Blood Pressure Father     Diabetes Father     High Blood Pressure Brother     Stroke Paternal Grandmother      Social History     Tobacco Use    Smoking status: Current Every Day Smoker     Packs/day: 0.25     Years: 22.00     Pack years: 5.50     Types: Cigarettes    Smokeless tobacco: Never Used    Tobacco comment: 3-4 cigarettes per day   Substance Use Topics    Alcohol use: Yes     Comment: OCC BEER    Drug use: No     Allergies:Codeine; Celebrex [celecoxib]; Clopidogrel; and Gabapentin    Review of Systems  General: No changes in weight, fatigue, or night sweats. HEENT: No blurry or decreased vision.   No changes in hearing, nasal discharge or sore throat. Cardiovascular:  See HPI. Respiratory: No cough, hemoptysis, or wheezing. No history of asthma. Gastrointestinal:  No abdominal pain, hematochezia, melana, constipation, diarrhea, or history of GI ulcers. Genito-Urinary: No dysuria or hematuria. No urgency or polyuria. Musculoskeletal:  No complaints of joint pain, joint swelling or muscular weakness/soreness. Neurological:  No dizziness, headaches,  speech problems or weakness. + hx TIA. Psychological:  No anxiety or depression. Hematological and Lymphatic: No abnormal bleeding or bruising, blood clots, jaundice or swollen lymph nodes. Endocrine:   No malaise/lethargy, palpitations, polydipsia/polyuria, temperature intolerance or unexpected weight changes  Skin:  No rashes or non-healing ulcers. Physical Exam:  /62   Pulse 76   Temp 97.1 °F (36.2 °C)   Ht 5' 7\" (1.702 m)   Wt 236 lb 3.2 oz (107.1 kg)   BMI 36.99 kg/m²    General (appearance):  No acute distress  Eyes: anicteric   Neck: soft, No JVD  Ears/Nose/Mouth/Thorat: No cyanosis  CV: RRR   Respiratory:  Clear  GI: soft, non-tender, non-distended  Skin: Warm, dry. No rashes  Neuro/Psych: Alert and oriented x 3. Appropriate behavior  Ext:  No c/c.  No edema  Pulses:  2+ radial and carotid     Weight  Wt Readings from Last 3 Encounters:   11/04/20 236 lb 3.2 oz (107.1 kg)   10/29/20 228 lb (103.4 kg)   12/12/19 227 lb 15.3 oz (103.4 kg)          CBC:   Lab Results   Component Value Date    WBC 6.4 10/28/2020    HGB 12.8 10/28/2020    HCT 38.7 10/28/2020    MCV 88.6 10/28/2020     10/28/2020     BMP:  Lab Results   Component Value Date    CREATININE 0.9 10/28/2020    BUN 12 10/28/2020     10/28/2020    K 4.0 10/28/2020     10/28/2020    CO2 28 10/28/2020     Mag:   Lab Results   Component Value Date    MG 2.20 10/29/2020     LIVER PROFILE:   Lab Results   Component Value Date    ALT 13 12/11/2019    AST 28 12/11/2019    ALKPHOS 51 12/11/2019    BILITOT 0.3 fails to show evidence of right to left   shunting. Medications:   Current Outpatient Medications   Medication Sig Dispense Refill    atorvastatin (LIPITOR) 40 MG tablet Take 1 tablet by mouth nightly 30 tablet 3    losartan-hydroCHLOROthiazide (HYZAAR) 50-12.5 MG per tablet Take 1 tablet by mouth daily 30 tablet 3    cyclobenzaprine (FLEXERIL) 5 MG tablet Take 5 mg by mouth 2 times daily as needed for Muscle spasms Only takes if has pain      lidocaine (LIDODERM) 5 % Place 1 patch onto the skin daily 12 hours on, 12 hours off. 30 patch 0    calcium carbonate (OSCAL) 500 MG TABS tablet Take 500 mg by mouth daily      aspirin 325 MG EC tablet Take 1 tablet by mouth daily 30 tablet 3    potassium chloride (K-TAB) 10 MEQ extended release tablet Take 10 mEq by mouth daily      Cholecalciferol (VITAMIN D3) 2000 UNITS CAPS Take 1,000 Units by mouth daily       albuterol (PROVENTIL HFA;VENTOLIN HFA) 108 (90 BASE) MCG/ACT inhaler Inhale 2 puffs into the lungs every 6 hours as needed for Wheezing or Shortness of Breath.  metFORMIN (GLUCOPHAGE) 500 MG tablet Take 500 mg by mouth daily (with breakfast)       No current facility-administered medications for this visit. Assessment:  1. TIA (transient ischemic attack)    2. HTN (hypertension), benign    3. Dyslipidemia    4.  Uncontrolled type 2 diabetes mellitus with complication, without long-term current use of insulin ([facility] [address]          Plan:  TIA   ASA, lipitor   14 day Zio patch    Echo negative for PFO in 12/2019  HTN   /62   Cont losartan/HCTZ  HLD   Lipitor   (pt having a hard time getting pharmacy to fill)  DM   Metformin     Follow up after monitor

## 2020-12-04 NOTE — TELEPHONE ENCOUNTER
The patient called back regarding her monitor results. Please call her as soon as possible. [phone]

## 2020-12-12 PROCEDURE — 0298T PR EXT ECG > 48HR TO 21 DAY REVIEW AND INTERPRETATN: CPT | Performed by: INTERNAL MEDICINE

## 2020-12-16 NOTE — PROGRESS NOTES
[address]   Cardiac Electrophysiology Consultation   Date: 12/16/2020  Reason for Consultation: TIA  Consult Requesting Physician: No att. providers found     Chief Complaint: No chief complaint on file. HPI: Alis Salcedo is a 63 y.o. female with PMH significant for HTN, HLD, type II DM, and multiple TIAs. Pt admitted in 10/2020 after presenting with right sided facial numbness, found to have a TIA. Echo was WNL and pt was sent home with an outpatient monitor. Interval History: Today, she is here to review the results of the monitor. 10 day monitor showed SR with high unifocal PVC burden (23%), but no atrial arrhythmias. Patient endorses occasional palpitations for the past few months or so. She is also somewhat stressed out secondary to multiple things going on. Otherwise, she denies any history of chest pain, shortness of breath, lightheadedness or syncopal episodes. During the interview sessions, she was tearful very often. Past Medical History:   Diagnosis Date    Anemia     Arthritis of knee     CVA [facility]cerebral vascular accident) ([facility] [address]     left side weakness    Heart disease     ? TYPE    Hypertension     Tobacco dependence         Past Surgical History:   Procedure Laterality Date    JOINT REPLACEMENT Right     Right knee    [place] CORRJ HALLUX VALGUS W/SESMDC W/2 OSTEOT Left 10/26/2018    MODIFIED YEN BUNIONECTOMY WITH FIRST METATARSAL OSTEOTOMY WITH  ANNALISA OSTEOTOMY, LEFT FOOT performed by Moris Robles DPM at [address]         Allergies: Allergies   Allergen Reactions    Codeine Rash and Itching    Celebrex [Celecoxib] Swelling     Facial edema    Clopidogrel Other (See Comments)     Fatigue, muscle pain    Gabapentin Other (See Comments)     Tremor       Medication:   Prior to Admission medications    Medication Sig Start Date End Date Taking?  Authorizing Provider atorvastatin (LIPITOR) 40 MG tablet Take 1 tablet by mouth nightly 11/4/20   LYNN Valenzuela - CNP   losartan-hydroCHLOROthiazide (HYZAAR) 50-12.5 MG per tablet Take 1 tablet by mouth daily 10/29/20   Christi Mcmullen DO   cyclobenzaprine (FLEXERIL) 5 MG tablet Take 5 mg by mouth 2 times daily as needed for Muscle spasms Only takes if has pain    Historical Provider, MD   lidocaine (LIDODERM) 5 % Place 1 patch onto the skin daily 12 hours on, 12 hours off. 11/28/18   Dudley He PA-C   calcium carbonate (OSCAL) 500 MG TABS tablet Take 500 mg by mouth daily    Historical Provider, MD   aspirin 325 MG EC tablet Take 1 tablet by mouth daily 8/27/18 11/4/20  Johanna Fine MD   metFORMIN (GLUCOPHAGE) 500 MG tablet Take 500 mg by mouth daily (with breakfast)    Historical Provider, MD   potassium chloride (K-TAB) 10 MEQ extended release tablet Take 10 mEq by mouth daily    Historical Provider, MD   Cholecalciferol (VITAMIN D3) 2000 UNITS CAPS Take 1,000 Units by mouth daily     Historical Provider, MD   albuterol (PROVENTIL HFA;VENTOLIN HFA) 108 (90 BASE) MCG/ACT inhaler Inhale 2 puffs into the lungs every 6 hours as needed for Wheezing or Shortness of Breath. Historical Provider, MD       Social History:   reports that she has been smoking cigarettes. She has a 5.50 pack-year smoking history. She has never used smokeless tobacco. She reports current alcohol use. She reports that she does not use drugs. Family History:  family history includes Cancer in her mother; Diabetes in her father; Heart Disease in her father; High Blood Pressure in her brother and father; Stroke in her paternal grandmother. Reviewed.  Denies family history of sudden cardiac death, arrhythmia, premature CAD    Review of System:    · General ROS: negative for - chills, fever   · Psychological ROS: negative for - anxiety or depression  · Ophthalmic ROS: negative for - eye pain or loss of vision · ENT ROS: negative for - epistaxis, headaches, nasal discharge, sore throat   · Allergy and Immunology ROS: negative for - hives, nasal congestion   · Hematological and Lymphatic ROS: negative for - bleeding problems, blood clots, bruising or jaundice  · Endocrine ROS: negative for - skin changes, temperature intolerance or unexpected weight changes  · Respiratory ROS: negative for - cough, hemoptysis, pleuritic pain, SOB, sputum changes or wheezing  · Cardiovascular ROS: Per HPI. · Gastrointestinal ROS: negative for - abdominal pain, blood in stools, diarrhea, hematemesis, melena, nausea/vomiting or swallowing difficulty/pain  · Genito-Urinary ROS: negative for - dysuria or incontinence  · Musculoskeletal ROS: negative for - joint swelling or muscle pain  · Neurological ROS: negative for - confusion, dizziness, gait disturbance, headaches, numbness/tingling, seizures, speech problems, tremors, visual changes or weakness  · Dermatological ROS: negative for - rash    Physical Examination:  Vitals:    12/17/20 1138   BP: 125/70   Pulse: 70   Temp: 97.1 °F (36.2 °C)       · Constitutional: Oriented. No distress. · Head: Normocephalic and atraumatic. · Mouth/Throat: Oropharynx is clear and moist.   · Eyes: Conjunctivae normal. EOM are normal.   · Neck: Normal range of motion. Neck supple. No rigidity. No JVD present. · Cardiovascular: Normal rate, regular rhythm, S1&S2 and intact distal pulses. · Pulmonary/Chest: Bilateral respiratory sounds. No wheezes. No rhonchi. · Abdominal: Soft. Bowel sounds present. No distension, No tenderness. · Musculoskeletal: No tenderness. No edema    · Lymphadenopathy: Has no cervical adenopathy. · Neurological: Alert and oriented. Cranial nerve appears intact, No Gross deficit   · Skin: Skin is warm and dry. No rash noted. · Psychiatric: Has a normal mood, affect and behavior     Labs:  Reviewed.

## 2020-12-17 ENCOUNTER — TELEPHONE (OUTPATIENT)
Dept: CARDIOLOGY CLINIC | Age: 65
End: 2020-12-17

## 2020-12-17 ENCOUNTER — OFFICE VISIT (OUTPATIENT)
Dept: CARDIOLOGY CLINIC | Age: 65
End: 2020-12-17
Payer: MEDICAID

## 2020-12-17 VITALS
SYSTOLIC BLOOD PRESSURE: 125 MMHG | WEIGHT: 234.8 LBS | DIASTOLIC BLOOD PRESSURE: 70 MMHG | HEART RATE: 70 BPM | TEMPERATURE: 97.1 F | BODY MASS INDEX: 36.77 KG/M2

## 2020-12-17 PROCEDURE — 4004F PT TOBACCO SCREEN RCVD TLK: CPT | Performed by: INTERNAL MEDICINE

## 2020-12-17 PROCEDURE — G8482 FLU IMMUNIZE ORDER/ADMIN: HCPCS | Performed by: INTERNAL MEDICINE

## 2020-12-17 PROCEDURE — 2022F DILAT RTA XM EVC RTNOPTHY: CPT | Performed by: INTERNAL MEDICINE

## 2020-12-17 PROCEDURE — G8427 DOCREV CUR MEDS BY ELIG CLIN: HCPCS | Performed by: INTERNAL MEDICINE

## 2020-12-17 PROCEDURE — 99214 OFFICE O/P EST MOD 30 MIN: CPT | Performed by: INTERNAL MEDICINE

## 2020-12-17 PROCEDURE — G8417 CALC BMI ABV UP PARAM F/U: HCPCS | Performed by: INTERNAL MEDICINE

## 2020-12-17 PROCEDURE — 93000 ELECTROCARDIOGRAM COMPLETE: CPT | Performed by: INTERNAL MEDICINE

## 2020-12-17 PROCEDURE — 3044F HG A1C LEVEL LT 7.0%: CPT | Performed by: INTERNAL MEDICINE

## 2020-12-17 PROCEDURE — 3017F COLORECTAL CA SCREEN DOC REV: CPT | Performed by: INTERNAL MEDICINE

## 2020-12-17 RX ORDER — DILTIAZEM HYDROCHLORIDE 120 MG/1
120 CAPSULE, COATED, EXTENDED RELEASE ORAL DAILY
Qty: 30 CAPSULE | Refills: 5 | Status: SHIPPED | OUTPATIENT
Start: 2020-12-17 | End: 2021-08-09

## 2020-12-17 RX ORDER — ATORVASTATIN CALCIUM 20 MG/1
40 TABLET, FILM COATED ORAL NIGHTLY
Qty: 30 TABLET | Refills: 5 | Status: SHIPPED | OUTPATIENT
Start: 2020-12-17 | End: 2022-01-20

## 2020-12-17 NOTE — TELEPHONE ENCOUNTER
Pharmacy called in stated they received medication refill   atorvastatin (LIPITOR) 20 MG tablet. Pharmacy need to need to verify the dose pt is to receive. Pharmacy can be reached at [phone].  Thanks

## 2020-12-21 ENCOUNTER — TELEPHONE (OUTPATIENT)
Dept: CARDIOLOGY CLINIC | Age: 65
End: 2020-12-21

## 2020-12-21 NOTE — TELEPHONE ENCOUNTER
Pt called stated she started taking medication  dilTIAZem (CARDIZEM CD) 120 MG extended release capsule. Pt states she is having to many side affects to the medication. Pt  Would like to know if there is any other medication she ca. Pt can be reached at [phone]. To address this matter.  Thanks

## 2020-12-22 NOTE — TELEPHONE ENCOUNTER
Advise patient that I will need to discuss with Dr. Kirt Alamo when he returns next week about other options. She has a high PVC burden.

## 2021-06-25 ENCOUNTER — TELEPHONE (OUTPATIENT)
Dept: CARDIOLOGY CLINIC | Age: 66
End: 2021-06-25

## 2021-06-25 NOTE — TELEPHONE ENCOUNTER
Called pt to R/S appt from 6.22.2021 and she would not like to R/S at this time.  She states nothing it wrong with her heart and she will wait til after diagnostic testing she is having on 7.3.21

## 2021-07-03 ENCOUNTER — HOSPITAL ENCOUNTER (OUTPATIENT)
Dept: CT IMAGING | Age: 66
Discharge: HOME | End: 2021-07-03
Payer: MEDICAID

## 2021-07-03 DIAGNOSIS — R59.1 LYMPHADENOPATHY: ICD-10-CM

## 2021-07-03 PROCEDURE — 71250 CT THORAX DX C-: CPT

## 2021-08-08 NOTE — PROGRESS NOTES
Jose Cr (:  1955) is a 63 y.o. female,New patient, here for evaluation of the following chief complaint(s):  Established New Doctor         ASSESSMENT/PLAN:  1. Uncontrolled type 2 diabetes mellitus with complication, without long-term current use of insulin (HCC)  Comments:  Metformin  A1C 5.9-->6.2%  2. Stroke of unknown cause ([facility] [address]  3. History of stroke  Comments:   mg  4. History of heart disease  Continue ASA    5. HTN (hypertension), benign  Comments:  Losartan/ HCTZ  6. Dyslipidemia  Comments:  Lipitor  7. Class 2 severe obesity with serious comorbidity and body mass index (BMI) of 36.0 to 36.9 in adult, unspecified obesity type ([facility] [address]  8. Routine eye exam  -     Amb External Referral To Ophthalmology  9. Screening for cervical cancer  -     [facility] - Desiree Kerr, CNP, Gynecology, [address] Maintenance:  Breast Cancer Screen: past due  Colon cancer screen: past due  Cervical cancer: referral placed  Return in about 3 months (around 2021) for HTN. This patient is a vague historian, whom jumps from problem to problem without completing thoughts. Difficult to follow conversation. Subjective        SUBJECTIVE/OBJECTIVE:  Hypertension  This is a chronic problem. The current episode started more than 1 year ago. The problem is controlled. Pertinent negatives include no chest pain, headaches, palpitations or shortness of breath. There are no associated agents to hypertension. Risk factors for coronary artery disease include dyslipidemia, diabetes mellitus and obesity. Past treatments include diuretics, angiotensin blockers and lifestyle changes. The current treatment provides significant improvement. Compliance problems include diet and exercise. Hyperlipidemia  This is a chronic problem. Factors aggravating her hyperlipidemia include smoking and thiazides. Pertinent negatives include no chest pain, myalgias or shortness of breath.  Current 236 lb 3.2 oz (107.1 kg)         Review of Systems   Constitutional: Negative for activity change and fever. HENT: Negative for congestion. Tonsillectomy   Eyes: Negative for visual disturbance. Respiratory: Negative for chest tightness and shortness of breath. Cardiovascular: Negative for chest pain, palpitations and leg swelling. Hypertension, hyperlipidemia    12/12/2019 Echo:   Normal left ventricle size, wall thickness, and systolic function with an   estimated ejection fraction of 55-60%. Gastrointestinal: Negative for abdominal pain, constipation and diarrhea. Endocrine: Negative for polyuria. Genitourinary: Negative for dysuria. Tubal Ligation   Musculoskeletal: Negative for arthralgias and myalgias. Left rotator cuff tendinitis  TKA right knee   Skin: Negative for rash. Neurological: Negative for dizziness, light-headedness and headaches. History of multiple TIA's and CVA   Psychiatric/Behavioral: Negative for agitation, decreased concentration and sleep disturbance. The patient is not nervous/anxious. Past Medical History:   Diagnosis Date    Anemia     Arthritis of knee     CVA (cerebral vascular accident) ([facility] [address]     left side weakness    Heart disease     ?  TYPE    Hypertension     Tobacco dependence      Past Surgical History:   Procedure Laterality Date    JOINT REPLACEMENT Right     Right knee    [place] CORRJ HALLUX VALGUS W/SESMDC W/2 OSTEOT Left 10/26/2018    MODIFIED YEN BUNIONECTOMY WITH FIRST METATARSAL OSTEOTOMY WITH  ANNALISA OSTEOTOMY, LEFT FOOT performed by Lee Garcia DPM at [facility] OR    TONSILLECTOMY      TUBAL LIGATION      VENTRAL HERNIA REPAIR       Family History   Problem Relation Age of Onset    Cancer Mother         LUNG    Heart Disease Father     High Blood Pressure Father     Diabetes Father     High Blood Pressure Brother     Stroke Paternal Grandmother      Social History     Tobacco Use    Smoking status: Former Smoker     Packs/day: 0.25     Years: 22.00     Pack years: 5.50     Types: Cigarettes     Quit date: 2021     Years since quittin.0    Smokeless tobacco: Never Used    Tobacco comment: 3-4 cigarettes per day   Vaping Use    Vaping Use: Never used   Substance Use Topics    Alcohol use: Yes     Comment: OCC BEER    Drug use: No    height is 5' 7\" (1.702 m) and weight is 246 lb (111.6 kg). Her temporal temperature is 97.2 °F (36.2 °C). Her blood pressure is 111/77 and her pulse is 70. Her oxygen saturation is 97%. Objective   Physical Exam  Vitals reviewed. Constitutional:       Appearance: She is well-developed. HENT:      Head: Normocephalic. Right Ear: Hearing and external ear normal.      Left Ear: Hearing and external ear normal.      Nose: Nose normal.   Eyes:      General: Lids are normal.   Cardiovascular:      Rate and Rhythm: Normal rate and regular rhythm. Heart sounds: Normal heart sounds, S1 normal and S2 normal.   Pulmonary:      Effort: Pulmonary effort is normal.      Breath sounds: Normal breath sounds. Musculoskeletal:         General: Normal range of motion. Skin:     General: Skin is warm and dry. Findings: No rash. Neurological:      Mental Status: She is alert and oriented to person, place, and time. GCS: GCS eye subscore is 4. GCS verbal subscore is 5. GCS motor subscore is 6. Psychiatric:         Attention and Perception: Attention and perception normal.         Speech: Speech is tangential.         Behavior: Behavior normal. Behavior is cooperative. On this date 2021 I have spent 30 minutes reviewing previous notes, test results and face to face with the patient discussing the diagnosis and importance of compliance with the treatment plan as well as documenting on the day of the visit. Reviewed patient's pertinent medical history, relevant laboratory results, imaging studies, and health maintenance.  Medications

## 2021-08-09 ENCOUNTER — OFFICE VISIT (OUTPATIENT)
Dept: PRIMARY CARE CLINIC | Age: 66
End: 2021-08-09
Payer: MEDICAID

## 2021-08-09 VITALS
OXYGEN SATURATION: 97 % | TEMPERATURE: 97.2 F | BODY MASS INDEX: 38.61 KG/M2 | DIASTOLIC BLOOD PRESSURE: 77 MMHG | HEART RATE: 70 BPM | WEIGHT: 246 LBS | HEIGHT: 67 IN | SYSTOLIC BLOOD PRESSURE: 111 MMHG

## 2021-08-09 DIAGNOSIS — I63.9 STROKE OF UNKNOWN CAUSE (HCC): ICD-10-CM

## 2021-08-09 DIAGNOSIS — Z13.29 THYROID DISORDER SCREEN: ICD-10-CM

## 2021-08-09 DIAGNOSIS — E66.01 CLASS 2 SEVERE OBESITY WITH SERIOUS COMORBIDITY AND BODY MASS INDEX (BMI) OF 36.0 TO 36.9 IN ADULT, UNSPECIFIED OBESITY TYPE (HCC): ICD-10-CM

## 2021-08-09 DIAGNOSIS — Z86.79 HISTORY OF HEART DISEASE: ICD-10-CM

## 2021-08-09 DIAGNOSIS — Z11.59 NEED FOR HEPATITIS C SCREENING TEST: ICD-10-CM

## 2021-08-09 DIAGNOSIS — Z86.73 HISTORY OF STROKE: ICD-10-CM

## 2021-08-09 DIAGNOSIS — Z12.4 SCREENING FOR CERVICAL CANCER: ICD-10-CM

## 2021-08-09 DIAGNOSIS — Z01.00 ROUTINE EYE EXAM: ICD-10-CM

## 2021-08-09 DIAGNOSIS — E78.5 DYSLIPIDEMIA: ICD-10-CM

## 2021-08-09 DIAGNOSIS — I10 HTN (HYPERTENSION), BENIGN: ICD-10-CM

## 2021-08-09 PROCEDURE — 2022F DILAT RTA XM EVC RTNOPTHY: CPT | Performed by: NURSE PRACTITIONER

## 2021-08-09 PROCEDURE — G8417 CALC BMI ABV UP PARAM F/U: HCPCS | Performed by: NURSE PRACTITIONER

## 2021-08-09 PROCEDURE — 3046F HEMOGLOBIN A1C LEVEL >9.0%: CPT | Performed by: NURSE PRACTITIONER

## 2021-08-09 PROCEDURE — G8427 DOCREV CUR MEDS BY ELIG CLIN: HCPCS | Performed by: NURSE PRACTITIONER

## 2021-08-09 PROCEDURE — 99214 OFFICE O/P EST MOD 30 MIN: CPT | Performed by: NURSE PRACTITIONER

## 2021-08-09 PROCEDURE — 3017F COLORECTAL CA SCREEN DOC REV: CPT | Performed by: NURSE PRACTITIONER

## 2021-08-09 PROCEDURE — 1036F TOBACCO NON-USER: CPT | Performed by: NURSE PRACTITIONER

## 2021-08-09 RX ORDER — FUROSEMIDE 20 MG/1
20 TABLET ORAL 2 TIMES DAILY
COMMUNITY
End: 2022-02-24 | Stop reason: SDUPTHER

## 2021-08-09 RX ORDER — LANOLIN ALCOHOL/MO/W.PET/CERES
3 CREAM (GRAM) TOPICAL DAILY
COMMUNITY
End: 2022-01-20

## 2021-08-09 SDOH — ECONOMIC STABILITY: FOOD INSECURITY: WITHIN THE PAST 12 MONTHS, THE FOOD YOU BOUGHT JUST DIDN'T LAST AND YOU DIDN'T HAVE MONEY TO GET MORE.: NEVER TRUE

## 2021-08-09 SDOH — ECONOMIC STABILITY: FOOD INSECURITY: WITHIN THE PAST 12 MONTHS, YOU WORRIED THAT YOUR FOOD WOULD RUN OUT BEFORE YOU GOT MONEY TO BUY MORE.: NEVER TRUE

## 2021-08-09 ASSESSMENT — PATIENT HEALTH QUESTIONNAIRE - PHQ9
1. LITTLE INTEREST OR PLEASURE IN DOING THINGS: 0
SUM OF ALL RESPONSES TO PHQ QUESTIONS 1-9: 0
SUM OF ALL RESPONSES TO PHQ QUESTIONS 1-9: 0
SUM OF ALL RESPONSES TO PHQ9 QUESTIONS 1 & 2: 0
2. FEELING DOWN, DEPRESSED OR HOPELESS: 0
SUM OF ALL RESPONSES TO PHQ QUESTIONS 1-9: 0

## 2021-08-09 ASSESSMENT — SOCIAL DETERMINANTS OF HEALTH (SDOH): HOW HARD IS IT FOR YOU TO PAY FOR THE VERY BASICS LIKE FOOD, HOUSING, MEDICAL CARE, AND HEATING?: NOT HARD AT ALL

## 2021-08-09 NOTE — PATIENT INSTRUCTIONS
Patient Education        Neck Arthritis: Exercises  Introduction  Here are some examples of exercises for you to try. The exercises may be suggested for a condition or for rehabilitation. Start each exercise slowly. Ease off the exercises if you start to have pain. You will be told when to start these exercises and which ones will work best for you. How to do the exercises  Neck stretches to the side   1. This stretch works best if you keep your shoulder down as you lean away from it. To help you remember to do this, start by relaxing your shoulders and lightly holding on to your thighs or your chair. 2. Tilt your head toward your shoulder and hold for 15 to 30 seconds. Let the weight of your head stretch your muscles. 3. Repeat 2 to 4 times toward each shoulder. Chin tuck   1. Lie on the floor with a rolled-up towel under your neck. Your head should be touching the floor. 2. Slowly bring your chin toward your chest.  3. Hold for a count of 6, and then relax for up to 10 seconds. 4. Repeat 8 to 12 times. Active cervical rotation   1. Sit in a firm chair, or stand up straight. 2. Keeping your chin level, turn your head to the right, and hold for 15 to 30 seconds. 3. Turn your head to the left and hold for 15 to 30 seconds. 4. Repeat 2 to 4 times to each side. Shoulder blade squeeze   1. While standing, squeeze your shoulder blades together. 2. Do not raise your shoulders up as you are squeezing. 3. Hold for 6 seconds. 4. Repeat 8 to 12 times. Shoulder rolls   1. Sit comfortably with your feet shoulder-width apart. You can also do this exercise standing up. 2. Roll your shoulders up, then back, and then down in a smooth, circular motion. 3. Repeat 2 to 4 times. Follow-up care is a key part of your treatment and safety. Be sure to make and go to all appointments, and call your doctor if you are having problems.  It's also a good idea to know your test results and keep a list of the medicines you take. Where can you learn more? Go to [email or web address].[email or web address] org and sign in to your [employer] account. Enter C469 in the [facility] box to learn more about \"Neck Arthritis: Exercises. \"     If you do not have an account, please click on the \"Sign Up Now\" link. Current as of: November 16, 2020               Content Version: 12.9  © 2006-2021 [employer]. Care instructions adapted under license by [facility] ([facility]). If you have questions about a medical condition or this instruction, always ask your healthcare professional. [place] [address] any warranty or liability for your use of this information. Patient Education        Neck Strain or Sprain: Rehab Exercises  Introduction  Here are some examples of exercises for you to try. The exercises may be suggested for a condition or for rehabilitation. Start each exercise slowly. Ease off the exercises if you start to have pain. You will be told when to start these exercises and which ones will work best for you. How to do the exercises  Neck rotation   1. Sit in a firm chair, or stand up straight. 2. Keeping your chin level, turn your head to the right, and hold for 15 to 30 seconds. 3. Turn your head to the left and hold for 15 to 30 seconds. 4. Repeat 2 to 4 times to each side. Neck stretches   1. Look straight ahead, and tip your right ear to your right shoulder. Do not let your left shoulder rise up as you tip your head to the right. 2. Hold for 15 to 30 seconds. 3. Tilt your head to the left. Do not let your right shoulder rise up as you tip your head to the left. 4. Hold for 15 to 30 seconds. 5. Repeat 2 to 4 times to each side. Forward neck flexion   1. Sit in a firm chair, or stand up straight. 2. Bend your head forward. 3. Hold for 15 to 30 seconds. 4. Repeat 2 to 4 times. Lateral (side) bend strengthening   1.  With your right hand, place your first two fingers on your right temple. 2. Start to bend your head to the side while using gentle pressure from your fingers to keep your head from bending. 3. Hold for about 6 seconds. 4. Repeat 8 to 12 times. 5. Switch hands and repeat the same exercise on your left side. Forward bend strengthening   1. Place your first two fingers of either hand on your forehead. 2. Start to bend your head forward while using gentle pressure from your fingers to keep your head from bending. 3. Hold for about 6 seconds. 4. Repeat 8 to 12 times. Neutral position strengthening   1. Using one hand, place your fingertips on the back of your head at the top of your neck. 2. Start to bend your head backward while using gentle pressure from your fingers to keep your head from bending. 3. Hold for about 6 seconds. 4. Repeat 8 to 12 times. Chin tuck   1. Lie on the floor with a rolled-up towel under your neck. Your head should be touching the floor. 2. Slowly bring your chin toward your chest.  3. Hold for a count of 6, and then relax for up to 10 seconds. 4. Repeat 8 to 12 times. Follow-up care is a key part of your treatment and safety. Be sure to make and go to all appointments, and call your doctor if you are having problems. It's also a good idea to know your test results and keep a list of the medicines you take. Where can you learn more? Go to [email or web address] and sign in to your [employer] account. Enter M679 in the [facility] box to learn more about \"Neck Strain or Sprain: Rehab Exercises. \"     If you do not have an account, please click on the \"Sign Up Now\" link. Current as of: November 16, 2020               Content Version: 12.9  © 5201-9321 Healthwise, Incorporated. Care instructions adapted under license by [facility] ([facility]).  If you have questions about a medical condition or this instruction, always ask your healthcare professional. Irving Sosa disclaims any warranty or liability for your use of this information.        Send prescription for glucometer supplies- [employer]  Have labs drawn 1 week before appointment

## 2021-08-10 ASSESSMENT — ENCOUNTER SYMPTOMS
CONSTIPATION: 0
ABDOMINAL PAIN: 0
DIARRHEA: 0
CHEST TIGHTNESS: 0
SHORTNESS OF BREATH: 0

## 2021-10-25 NOTE — TELEPHONE ENCOUNTER
Medication:   Requested Prescriptions     Pending Prescriptions Disp Refills    albuterol sulfate  (90 Base) MCG/ACT inhaler [Pharmacy Med Name: ALBUTEROL SULFATE  ( 108 (90 BAS Aerosol] 18 g 3     Sig: INHALE 2 PUFF BY INHALATION ROUTE EVERY 4 - 6 HOURS AS NEEDED AS NEEDED FOR SHORTNESS OF BREATH    TRUE METRIX BLOOD GLUCOSE TEST strip [Pharmacy Med Name: TRUE METRIX GLUCOSE TEST ST Strip] 50 strip 3     Sig: INSERT BY IN GLUCOMETER ROUTE EVERY DAY AS DIRECTED FOR DIABETES TESTING        Last Filled:      Patient Phone Number: [phone] (home)     Last appt: 8/9/2021   Next appt: 11/11/2021    Last OARRS: No flowsheet data found.

## 2021-10-26 RX ORDER — ALBUTEROL SULFATE 90 UG/1
AEROSOL, METERED RESPIRATORY (INHALATION)
Qty: 18 G | Refills: 3 | Status: SHIPPED | OUTPATIENT
Start: 2021-10-26 | End: 2022-02-24

## 2021-10-26 RX ORDER — CALCIUM CITRATE/VITAMIN D3 200MG-6.25
TABLET ORAL
Qty: 50 STRIP | Refills: 3 | Status: SHIPPED | OUTPATIENT
Start: 2021-10-26 | End: 2022-02-24

## 2021-11-01 DIAGNOSIS — Z13.29 THYROID DISORDER SCREEN: ICD-10-CM

## 2021-11-01 DIAGNOSIS — E78.5 DYSLIPIDEMIA: ICD-10-CM

## 2021-11-01 DIAGNOSIS — Z11.59 NEED FOR HEPATITIS C SCREENING TEST: ICD-10-CM

## 2021-11-01 DIAGNOSIS — Z86.79 HISTORY OF HEART DISEASE: ICD-10-CM

## 2021-11-01 DIAGNOSIS — I10 HTN (HYPERTENSION), BENIGN: ICD-10-CM

## 2021-11-01 LAB
A/G RATIO: 1.7 (ref 1.1–2.2)
ALBUMIN SERPL-MCNC: 4.1 G/DL (ref 3.4–5)
ALP BLD-CCNC: 69 U/L (ref 40–129)
ALT SERPL-CCNC: 12 U/L (ref 10–40)
ANION GAP SERPL CALCULATED.3IONS-SCNC: 14 MMOL/L (ref 3–16)
AST SERPL-CCNC: 16 U/L (ref 15–37)
BASOPHILS ABSOLUTE: 0 K/UL (ref 0–0.2)
BASOPHILS RELATIVE PERCENT: 0.7 %
BILIRUB SERPL-MCNC: 0.4 MG/DL (ref 0–1)
BUN BLDV-MCNC: 12 MG/DL (ref 7–20)
CALCIUM SERPL-MCNC: 10 MG/DL (ref 8.3–10.6)
CHLORIDE BLD-SCNC: 104 MMOL/L (ref 99–110)
CHOLESTEROL, TOTAL: 180 MG/DL (ref 0–199)
CO2: 25 MMOL/L (ref 21–32)
CREAT SERPL-MCNC: 0.9 MG/DL (ref 0.6–1.2)
EOSINOPHILS ABSOLUTE: 0.3 K/UL (ref 0–0.6)
EOSINOPHILS RELATIVE PERCENT: 5.5 %
GFR AFRICAN AMERICAN: >60
GFR NON-AFRICAN AMERICAN: >60
GLUCOSE BLD-MCNC: 107 MG/DL (ref 70–99)
HCT VFR BLD CALC: 38.2 % (ref 36–48)
HDLC SERPL-MCNC: 75 MG/DL (ref 40–60)
HEMOGLOBIN: 12.4 G/DL (ref 12–16)
HEPATITIS C ANTIBODY INTERPRETATION: NORMAL
LDL CHOLESTEROL CALCULATED: 89 MG/DL
LYMPHOCYTES ABSOLUTE: 3.4 K/UL (ref 1–5.1)
LYMPHOCYTES RELATIVE PERCENT: 58.3 %
MCH RBC QN AUTO: 28.4 PG (ref 26–34)
MCHC RBC AUTO-ENTMCNC: 32.5 G/DL (ref 31–36)
MCV RBC AUTO: 87.4 FL (ref 80–100)
MONOCYTES ABSOLUTE: 0.4 K/UL (ref 0–1.3)
MONOCYTES RELATIVE PERCENT: 7.2 %
NEUTROPHILS ABSOLUTE: 1.6 K/UL (ref 1.7–7.7)
NEUTROPHILS RELATIVE PERCENT: 28.3 %
PDW BLD-RTO: 14.6 % (ref 12.4–15.4)
PLATELET # BLD: 263 K/UL (ref 135–450)
PMV BLD AUTO: 8.7 FL (ref 5–10.5)
POTASSIUM SERPL-SCNC: 4.1 MMOL/L (ref 3.5–5.1)
RBC # BLD: 4.37 M/UL (ref 4–5.2)
SODIUM BLD-SCNC: 143 MMOL/L (ref 136–145)
T4 FREE: 1.1 NG/DL (ref 0.9–1.8)
TOTAL PROTEIN: 6.5 G/DL (ref 6.4–8.2)
TRIGL SERPL-MCNC: 81 MG/DL (ref 0–150)
TSH REFLEX: 2.13 UIU/ML (ref 0.27–4.2)
VLDLC SERPL CALC-MCNC: 16 MG/DL
WBC # BLD: 5.8 K/UL (ref 4–11)

## 2021-11-02 LAB
ESTIMATED AVERAGE GLUCOSE: 128.4 MG/DL
HBA1C MFR BLD: 6.1 %

## 2021-11-11 ENCOUNTER — OFFICE VISIT (OUTPATIENT)
Dept: PRIMARY CARE CLINIC | Age: 66
End: 2021-11-11
Payer: MEDICAID

## 2021-11-11 VITALS
HEART RATE: 63 BPM | DIASTOLIC BLOOD PRESSURE: 75 MMHG | SYSTOLIC BLOOD PRESSURE: 121 MMHG | HEIGHT: 66 IN | TEMPERATURE: 96.6 F | BODY MASS INDEX: 40.6 KG/M2 | WEIGHT: 252.6 LBS | OXYGEN SATURATION: 96 %

## 2021-11-11 DIAGNOSIS — E11.59 HYPERTENSION ASSOCIATED WITH DIABETES (HCC): ICD-10-CM

## 2021-11-11 DIAGNOSIS — Z01.00 ROUTINE EYE EXAM: ICD-10-CM

## 2021-11-11 DIAGNOSIS — Z12.4 CERVICAL CANCER SCREENING: ICD-10-CM

## 2021-11-11 DIAGNOSIS — I15.2 HYPERTENSION ASSOCIATED WITH DIABETES (HCC): ICD-10-CM

## 2021-11-11 DIAGNOSIS — Z23 FLU VACCINE NEED: ICD-10-CM

## 2021-11-11 DIAGNOSIS — E11.65 CONTROLLED TYPE 2 DIABETES MELLITUS WITH HYPERGLYCEMIA, WITHOUT LONG-TERM CURRENT USE OF INSULIN (HCC): Primary | ICD-10-CM

## 2021-11-11 DIAGNOSIS — Z12.31 ENCOUNTER FOR SCREENING MAMMOGRAM FOR MALIGNANT NEOPLASM OF BREAST: ICD-10-CM

## 2021-11-11 DIAGNOSIS — E78.5 DYSLIPIDEMIA: ICD-10-CM

## 2021-11-11 PROCEDURE — 90674 CCIIV4 VAC NO PRSV 0.5 ML IM: CPT | Performed by: NURSE PRACTITIONER

## 2021-11-11 PROCEDURE — G8482 FLU IMMUNIZE ORDER/ADMIN: HCPCS | Performed by: NURSE PRACTITIONER

## 2021-11-11 PROCEDURE — G8427 DOCREV CUR MEDS BY ELIG CLIN: HCPCS | Performed by: NURSE PRACTITIONER

## 2021-11-11 PROCEDURE — 99214 OFFICE O/P EST MOD 30 MIN: CPT | Performed by: NURSE PRACTITIONER

## 2021-11-11 PROCEDURE — 1036F TOBACCO NON-USER: CPT | Performed by: NURSE PRACTITIONER

## 2021-11-11 PROCEDURE — 3017F COLORECTAL CA SCREEN DOC REV: CPT | Performed by: NURSE PRACTITIONER

## 2021-11-11 PROCEDURE — 3044F HG A1C LEVEL LT 7.0%: CPT | Performed by: NURSE PRACTITIONER

## 2021-11-11 PROCEDURE — 90471 IMMUNIZATION ADMIN: CPT | Performed by: NURSE PRACTITIONER

## 2021-11-11 PROCEDURE — 2022F DILAT RTA XM EVC RTNOPTHY: CPT | Performed by: NURSE PRACTITIONER

## 2021-11-11 PROCEDURE — G8417 CALC BMI ABV UP PARAM F/U: HCPCS | Performed by: NURSE PRACTITIONER

## 2021-11-11 ASSESSMENT — ENCOUNTER SYMPTOMS
DIARRHEA: 0
ABDOMINAL PAIN: 0
SHORTNESS OF BREATH: 0
CONSTIPATION: 0
CHEST TIGHTNESS: 0

## 2021-11-11 NOTE — PATIENT INSTRUCTIONS
Schedule Mammogram at [address] [place] [address] for 2/2021  Patient Education        Neck Arthritis: Exercises  Introduction  Here are some examples of exercises for you to try. The exercises may be suggested for a condition or for rehabilitation. Start each exercise slowly. Ease off the exercises if you start to have pain. You will be told when to start these exercises and which ones will work best for you. How to do the exercises  Neck stretches to the side    1. This stretch works best if you keep your shoulder down as you lean away from it. To help you remember to do this, start by relaxing your shoulders and lightly holding on to your thighs or your chair. 2. Tilt your head toward your shoulder and hold for 15 to 30 seconds. Let the weight of your head stretch your muscles. 3. Repeat 2 to 4 times toward each shoulder. Chin tuck    1. Lie on the floor with a rolled-up towel under your neck. Your head should be touching the floor. 2. Slowly bring your chin toward your chest.  3. Hold for a count of 6, and then relax for up to 10 seconds. 4. Repeat 8 to 12 times. Active cervical rotation    1. Sit in a firm chair, or stand up straight. 2. Keeping your chin level, turn your head to the right, and hold for 15 to 30 seconds. 3. Turn your head to the left and hold for 15 to 30 seconds. 4. Repeat 2 to 4 times to each side. Shoulder blade squeeze    1. While standing, squeeze your shoulder blades together. 2. Do not raise your shoulders up as you are squeezing. 3. Hold for 6 seconds. 4. Repeat 8 to 12 times. Shoulder rolls    1. Sit comfortably with your feet shoulder-width apart. You can also do this exercise standing up. 2. Roll your shoulders up, then back, and then down in a smooth, circular motion. 3. Repeat 2 to 4 times. Follow-up care is a key part of your treatment and safety. Be sure to make and go to all appointments, and call your doctor if you are having problems.  It's also a good idea to know your test results and keep a list of the medicines you take. Where can you learn more? Go to https://[email or web address].[email or web address] and sign in to your [employer] account. Enter N673 in the [facility] box to learn more about \"Neck Arthritis: Exercises. \"     If you do not have an account, please click on the \"Sign Up Now\" link. Current as of: July 1, 2021               Content Version: 13.0  © 2006-2021 Healthwise, Incorporated. Care instructions adapted under license by [facility] ([facility]). If you have questions about a medical condition or this instruction, always ask your healthcare professional. [place] [address] any warranty or liability for your use of this information. Patient Education        Neck Spasm: Exercises  Introduction  Here are some examples of exercises for you to try. The exercises may be suggested for a condition or for rehabilitation. Start each exercise slowly. Ease off the exercises if you start to have pain. You will be told when to start these exercises and which ones will work best for you. How to do the exercises  Levator scapula stretch    1. Sit in a firm chair, or stand up straight. 2. Gently tilt your head toward your left shoulder. 3. Turn your head to look down into your armpit, bending your head slightly forward. Let the weight of your head stretch your neck muscles. 4. Hold for 15 to 30 seconds. 5. Return to your starting position. 6. Follow the same instructions above, but tilt your head toward your right shoulder. 7. Repeat 2 to 4 times toward each shoulder. Upper trapezius stretch    1. Sit in a firm chair, or stand up straight. 2. This stretch works best if you keep your shoulder down as you lean away from it. To help you remember to do this, start by relaxing your shoulders and lightly holding on to your thighs or your chair. 3. Tilt your head toward your shoulder and hold for 15 to 30 seconds.  Let the weight of your head stretch your muscles. 4. If you would like a little added stretch, place your arm behind your back. Use the arm opposite of the direction you are tilting your head. For example, if you are tilting your head to the left, place your right arm behind your back. 5. Repeat 2 to 4 times toward each shoulder. Neck rotation    1. Sit in a firm chair, or stand up straight. 2. Keeping your chin level, turn your head to the right, and hold for 15 to 30 seconds. 3. Turn your head to the left, and hold for 15 to 30 seconds. 4. Repeat 2 to 4 times to each side. Chin tuck    1. Lie on the floor with a rolled-up towel under your neck. Your head should be touching the floor. 2. Slowly bring your chin toward the front of your neck. 3. Hold for a count of 6, and then relax for up to 10 seconds. 4. Repeat 8 to 12 times. Forward neck flexion    1. Sit in a firm chair, or stand up straight. 2. Bend your head forward. 3. Hold for 15 to 30 seconds, then return to your starting position. 4. Repeat 2 to 4 times. Follow-up care is a key part of your treatment and safety. Be sure to make and go to all appointments, and call your doctor if you are having problems. It's also a good idea to know your test results and keep a list of the medicines you take. Where can you learn more? Go to [email or web address].Terpenoid [email or web address] org and sign in to your [employer] account. Enter P962 in the [facility] [facility] box to learn more about \"Neck Spasm: Exercises. \"     If you do not have an account, please click on the \"Sign Up Now\" link. Current as of: July 1, 2021               Content Version: 13.0  © 0498-9119 Healthwise, Incorporated. Care instructions adapted under license by [facility] ([facility]). If you have questions about a medical condition or this instruction, always ask your healthcare professional. [address] any warranty or liability for your use of this information.          Patient Education        Shoulder Blade: Exercises  Introduction  Here are some examples of exercises for you to try. The exercises may be suggested for a condition or for rehabilitation. Start each exercise slowly. Ease off the exercises if you start to have pain. You will be told when to start these exercises and which ones will work best for you. How to do the exercises  Shoulder roll    1. Stand tall with your chin slightly tucked. Imagine that a string at the top of your head is pulling you straight up. 2. Keep your arms relaxed. All motion will be in your shoulders. 3. Shrug your shoulders up toward your ears, then up and back. [place] your shoulders down and back, like you're sliding your hands down into your back pants pockets. 4. Repeat the circles at least 2 to 4 times. 5. This exercise is also helpful anytime you want to relax. Lower neck and upper back stretch    1. With your arms about shoulder height, clasp your hands in front of you. 2. Drop your chin toward your chest.  3. Reach straight forward so you are rounding your upper back. Think about pulling your shoulder blades apart. Anamika Dow feel a stretch across your upper back and shoulders. Hold for at least 6 seconds. 4. Repeat 2 to 4 times. Triceps stretch    1. Reach your arm straight up. 2. Keeping your elbow in place, bend your arm and reach your hand down behind your back. 3. With your other hand, apply gentle pressure to the bent elbow. Anamika Dow feel a stretch at the back of your upper arm and shoulder. Hold about 6 seconds. 4. Repeat 2 to 4 times with each arm. Shoulder stretch    1. Relax your shoulders. 2. Raise one arm to shoulder height, and reach it across your chest.  3. Pull the arm slightly toward you with your other arm. This will help you get a gentle stretch. Hold for about 6 seconds. 4. Repeat 2 to 4 times. Shoulder blade squeeze    1. Sit or stand up tall with your arms at your sides. 2. Keep your shoulders relaxed and down, not shrugged.   3. Squeeze your shoulder blades together. Hold for 6 seconds, then relax. 4. Repeat 8 to 12 times. Straight-arm shoulder blade squeeze    1. Sit or stand tall. Relax your shoulders. 2. With palms down, hold your elastic tubing or band straight out in front of you. 3. Start with slight tension in the tubing or band, with your hands about shoulder-width apart. 4. Slowly pull straight out to the sides, squeezing your shoulder blades together. Keep your arms straight and at shoulder height. Slowly release. 5. Repeat 8 to 12 times. Rowing    1. [place] your elastic tubing or band at about waist height. Take one end in each hand. 2. Sit or stand with your feet hip-width apart. 3. Hold your arms straight in front of you. Adjust your distance to create slight tension in the tubing or band. 4. Slightly tuck your chin. Relax your shoulders. 5. Without shrugging your shoulders, pull straight back. Your elbows will pass alongside your waist.  Pull-downs    1. [place] your elastic tubing or band in the top of a closed door. Take one end in each hand. 2. Either sit or stand, depending on what is more comfortable. If you feel unsteady, sit on a chair. 3. Start with your arms up and comfortably apart, elbows straight. There should be a slight tension in the tubing or band. 4. Slightly tuck your chin, and look straight ahead. 5. Keeping your back straight, slowly pull down and back, bending your elbows. 6. Stop where your hands are level with your chin, in a \"goalpost\" position. 7. Repeat 8 to 12 times. Chest T stretch    1. Lie on your back. Raise your knees so they are bent. Plant your feet on the floor, hip-width apart. 2. Tuck your chin, and relax your shoulders. 3. Reach your arms straight out to the sides. If you don't feel a mild stretch in your shoulders and across your chest, use a foam roll or a tightly rolled blanket under your spine, from your tailbone to your head.   4. Relax in this position for at least 15 to 30 healthcare professional. [place] [address] any warranty or liability for your use of this information.        Schedule appointment with Ophthalmology and Gynecology

## 2021-11-11 NOTE — PROGRESS NOTES
Geri Christina (:  1955) is a 63 y.o. female,Established patient, here for evaluation of the following chief complaint(s):  Follow-up         ASSESSMENT/PLAN:  1. Controlled type 2 diabetes mellitus with hyperglycemia, without long-term current use of insulin ([facility] [address]- brought log today:   -Maintain healthy life style changes  -Decrease sugary foods, drinks, and starches  2. Encounter for screening mammogram for malignant neoplasm of breast  -completed  3. Flu vaccine need  -     INFLUENZA, MDCK QUADV, 2 YRS AND OLDER, IM, PF, PREFILL SYR OR SDV, 0.5ML (FLUCELVAX QUADV, PF)  4. Cervical cancer screening  -     AFL - Desiree Kerr, CNP, Gynecology, [facility]  5. Routine eye exam  -     Amb External Referral To Ophthalmology  6. Dyslipidemia  -Lipitor  7. Hypertension associated with diabetes ([facility] [address]  -[facility] Maintenance:  Covid vaccine: UTD  Flu vaccine: order placed  Return in about 6 months (around 2022). Subjective   SUBJECTIVE/OBJECTIVE:  HPI   10/14 had lip swelling, evaluated at Urgent Care, prescribed Diphenhydramine. Swelling has resolved. Hypertension  This is a chronic problem. The current episode started more than 1 year ago. The problem is controlled. Pertinent negatives include no chest pain, headaches, palpitations or shortness of breath. There are no associated agents to hypertension. Risk factors for coronary artery disease include dyslipidemia, diabetes mellitus and obesity. Past treatments include diuretics, angiotensin blockers and lifestyle changes. The current treatment provides significant improvement. Compliance problems include diet and exercise. Hyperlipidemia  This is a chronic problem. Factors aggravating her hyperlipidemia include smoking and thiazides. Pertinent negatives include no chest pain, myalgias or shortness of breath. Current antihyperlipidemic treatment includes diet change, exercise and statins.  The current treatment provides moderate improvement of lipids. Compliance problems include adherence to diet and adherence to exercise. Risk factors for coronary artery disease include hypertension, obesity, post-menopausal, dyslipidemia and diabetes mellitus. States she forgets to take the medication. Diabetes  She presents for her follow-up diabetic visit. She has type 2 diabetes mellitus. There are no hypoglycemic associated symptoms. Pertinent negatives for hypoglycemia include no dizziness, headaches or nervousness/anxiousness. Pertinent negatives for diabetes include no chest pain and no polyuria. There are no hypoglycemic complications. Symptoms are stable. There are no diabetic complications. Risk factors for coronary artery disease include diabetes mellitus, dyslipidemia, family history, obesity, post-menopausal and tobacco exposure. Current diabetic treatment includes diet. She is compliant with treatment some of the time. She is following a generally unhealthy diet. An ACE inhibitor/angiotensin II receptor blocker is being taken. brought  Blood sugar readings to appointment: . History of TIA's  Has had multiple TIA's, most recent in 10/2020. 10 day monitor revealed  SR with unifocal PVC's but no atrial arrhythmias. Followed by Cardiac electrophysiology. Patient is current everyday smoker. Shortness of breath  States the albuterol inhaler she has is not effective. She demonstrated use of inhaler. States it goes onto her tongue. Wants inhaler device with a larger opening.      Reporting some neck stiffness intermittently.     Obesity  General weight loss/lifestyle modification strategies discussed (elicit support from others; identify saboteurs; non-food rewards, etc).     Goals:   -Eat 4-5 times daily  -Avoid high fat and high sugar foods  -Include protein with all meals and snacks  -Avoid carbonation and caffeine  -Avoid calorie containing beverages  -Increase physical activity as tolerated     Overweight/Obesity related to lack of exercise, sedentary lifestyle, unhealthy eating habits, and unsuccessful diet attempts as evidenced by BMI. BMI: 40.77  Wt Readings from Last 3 Encounters:   11/11/21 252 lb 9.6 oz (114.6 kg)   08/09/21 246 lb (111.6 kg)   12/17/20 234 lb 12.8 oz (106.5 kg)     Review of Systems   Constitutional: Negative for activity change and fever. HENT: Negative for congestion. Tonsillectomy   Eyes: Negative for visual disturbance. Respiratory: Negative for chest tightness and shortness of breath. Cardiovascular: Negative for chest pain, palpitations and leg swelling. Hypertension, hyperlipidemia    12/12/2019 Echo:   Normal left ventricle size, wall thickness, and systolic function with an   estimated ejection fraction of 55-60%. Gastrointestinal: Negative for abdominal pain, constipation and diarrhea. Endocrine: Negative for polyuria. Genitourinary: Negative for dysuria. Tubal Ligation   Musculoskeletal: Negative for arthralgias and myalgias. Left rotator cuff tendinitis  TKA right knee   Skin: Negative for rash. Neurological: Negative for dizziness, light-headedness and headaches. History of multiple TIA's and CVA   Psychiatric/Behavioral: Negative for agitation, decreased concentration and sleep disturbance. The patient is not nervous/anxious. Objective    Past Medical History:   Diagnosis Date    Anemia     Arthritis of knee     CVA (cerebral vascular accident) ([facility] [address]     left side weakness    Heart disease     ?  TYPE    Hypertension     Tobacco dependence      Past Surgical History:   Procedure Laterality Date    JOINT REPLACEMENT Right     Right knee    TN CORRJ HALLUX VALGUS W/SESMDC W/2 OSTEOT Left 10/26/2018    MODIFIED YEN BUNIONECTOMY WITH FIRST METATARSAL OSTEOTOMY WITH  ANNALISA OSTEOTOMY, LEFT FOOT performed by Symone Allen DPM at [address] [place] [facility]       Family History   Problem Relation Age of Onset    Cancer Mother         LUNG    Heart Disease Father     High Blood Pressure Father     Diabetes Father     High Blood Pressure Brother     Stroke Paternal Grandmother     height is 5' 6\" (1.676 m) and weight is 252 lb 9.6 oz (114.6 kg). Her temperature is 96.6 °F (35.9 °C). Her blood pressure is 121/75 and her pulse is 63. Her oxygen saturation is 96%. Outpatient Encounter Medications as of 11/11/2021   Medication Sig Dispense Refill    albuterol sulfate  (90 Base) MCG/ACT inhaler INHALE 2 PUFF BY INHALATION ROUTE EVERY 4 - 6 HOURS AS NEEDED AS NEEDED FOR SHORTNESS OF BREATH 18 g 3    TRUE METRIX BLOOD GLUCOSE TEST strip INSERT BY IN GLUCOMETER ROUTE EVERY DAY AS DIRECTED FOR DIABETES TESTING 50 strip 3    melatonin 3 MG TABS tablet Take 3 mg by mouth daily      furosemide (LASIX) 20 MG tablet Take 20 mg by mouth 2 times daily      atorvastatin (LIPITOR) 20 MG tablet Take 2 tablets by mouth nightly 30 tablet 5    losartan-hydroCHLOROthiazide (HYZAAR) 50-12.5 MG per tablet Take 1 tablet by mouth daily 30 tablet 3    [DISCONTINUED] cyclobenzaprine (FLEXERIL) 5 MG tablet Take 5 mg by mouth 2 times daily as needed for Muscle spasms Only takes if has pain      aspirin 325 MG EC tablet Take 1 tablet by mouth daily 30 tablet 3    Cholecalciferol (VITAMIN D3) 2000 UNITS CAPS Take 1,000 Units by mouth daily        No facility-administered encounter medications on file as of 11/11/2021. Physical Exam  Vitals reviewed. Constitutional:       Appearance: She is well-developed. HENT:      Head: Normocephalic. Right Ear: Hearing and external ear normal.      Left Ear: Hearing and external ear normal.      Nose: Nose normal.   Eyes:      General: Lids are normal.   Cardiovascular:      Rate and Rhythm: Normal rate and regular rhythm.       Heart sounds: Normal heart sounds, S1 normal and S2 normal.   Pulmonary: Effort: Pulmonary effort is normal.      Breath sounds: Normal breath sounds. Musculoskeletal:         General: Normal range of motion. Skin:     General: Skin is warm and dry. Findings: No rash. Neurological:      Mental Status: She is alert and oriented to person, place, and time. GCS: GCS eye subscore is 4. GCS verbal subscore is 5. GCS motor subscore is 6. Psychiatric:         Speech: Speech normal.         Behavior: Behavior normal. Behavior is cooperative. On this date 11/11/2021 I have spent 25 minutes reviewing previous notes, test results and face to face with the patient discussing the diagnosis and importance of compliance with the treatment plan as well as documenting on the day of the visit. An electronic signature was used to authenticate this note.     --Julio Cesar aMttson, LYNN - CNP

## 2022-01-04 ENCOUNTER — APPOINTMENT (OUTPATIENT)
Dept: GENERAL RADIOLOGY | Age: 67
End: 2022-01-04
Payer: MEDICAID

## 2022-01-04 ENCOUNTER — HOSPITAL ENCOUNTER (EMERGENCY)
Age: 67
Discharge: HOME | End: 2022-01-04
Attending: STUDENT IN AN ORGANIZED HEALTH CARE EDUCATION/TRAINING PROGRAM
Payer: MEDICAID

## 2022-01-04 VITALS
TEMPERATURE: 98.7 F | SYSTOLIC BLOOD PRESSURE: 190 MMHG | HEART RATE: 68 BPM | DIASTOLIC BLOOD PRESSURE: 105 MMHG | WEIGHT: 234 LBS | BODY MASS INDEX: 37.61 KG/M2 | RESPIRATION RATE: 17 BRPM | HEIGHT: 66 IN | OXYGEN SATURATION: 99 %

## 2022-01-04 DIAGNOSIS — R07.9 CHEST PAIN, UNSPECIFIED TYPE: Primary | ICD-10-CM

## 2022-01-04 LAB
ANION GAP SERPL CALCULATED.3IONS-SCNC: 9 MMOL/L (ref 3–16)
BASOPHILS ABSOLUTE: 0 K/UL (ref 0–0.2)
BASOPHILS RELATIVE PERCENT: 0.8 %
BUN BLDV-MCNC: 13 MG/DL (ref 7–20)
CALCIUM SERPL-MCNC: 9.4 MG/DL (ref 8.3–10.6)
CHLORIDE BLD-SCNC: 105 MMOL/L (ref 99–110)
CO2: 25 MMOL/L (ref 21–32)
CREAT SERPL-MCNC: 0.8 MG/DL (ref 0.6–1.2)
D DIMER: <200 NG/ML DDU (ref 0–229)
EKG ATRIAL RATE: 67 BPM
EKG DIAGNOSIS: NORMAL
EKG P AXIS: 63 DEGREES
EKG P-R INTERVAL: 172 MS
EKG Q-T INTERVAL: 406 MS
EKG QRS DURATION: 94 MS
EKG QTC CALCULATION (BAZETT): 429 MS
EKG R AXIS: 2 DEGREES
EKG T AXIS: 35 DEGREES
EKG VENTRICULAR RATE: 67 BPM
EOSINOPHILS ABSOLUTE: 0.2 K/UL (ref 0–0.6)
EOSINOPHILS RELATIVE PERCENT: 3.3 %
GFR AFRICAN AMERICAN: >60
GFR NON-AFRICAN AMERICAN: >60
GLUCOSE BLD-MCNC: 88 MG/DL (ref 70–99)
HCT VFR BLD CALC: 39.3 % (ref 36–48)
HEMOGLOBIN: 13.2 G/DL (ref 12–16)
LYMPHOCYTES ABSOLUTE: 2.6 K/UL (ref 1–5.1)
LYMPHOCYTES RELATIVE PERCENT: 50.6 %
MCH RBC QN AUTO: 29.4 PG (ref 26–34)
MCHC RBC AUTO-ENTMCNC: 33.5 G/DL (ref 31–36)
MCV RBC AUTO: 87.5 FL (ref 80–100)
MONOCYTES ABSOLUTE: 0.4 K/UL (ref 0–1.3)
MONOCYTES RELATIVE PERCENT: 8 %
NEUTROPHILS ABSOLUTE: 1.9 K/UL (ref 1.7–7.7)
NEUTROPHILS RELATIVE PERCENT: 37.3 %
PDW BLD-RTO: 14.6 % (ref 12.4–15.4)
PLATELET # BLD: 276 K/UL (ref 135–450)
PMV BLD AUTO: 8.2 FL (ref 5–10.5)
POTASSIUM REFLEX MAGNESIUM: 3.8 MMOL/L (ref 3.5–5.1)
RBC # BLD: 4.49 M/UL (ref 4–5.2)
REASON FOR REJECTION: NORMAL
REJECTED TEST: NORMAL
SODIUM BLD-SCNC: 139 MMOL/L (ref 136–145)
TROPONIN: <0.01 NG/ML
TROPONIN: <0.01 NG/ML
WBC # BLD: 5.1 K/UL (ref 4–11)

## 2022-01-04 PROCEDURE — 99283 EMERGENCY DEPT VISIT LOW MDM: CPT

## 2022-01-04 PROCEDURE — 80048 BASIC METABOLIC PNL TOTAL CA: CPT

## 2022-01-04 PROCEDURE — 93005 ELECTROCARDIOGRAM TRACING: CPT | Performed by: STUDENT IN AN ORGANIZED HEALTH CARE EDUCATION/TRAINING PROGRAM

## 2022-01-04 PROCEDURE — 84484 ASSAY OF TROPONIN QUANT: CPT

## 2022-01-04 PROCEDURE — 85025 COMPLETE CBC W/AUTO DIFF WBC: CPT

## 2022-01-04 PROCEDURE — 71046 X-RAY EXAM CHEST 2 VIEWS: CPT

## 2022-01-04 PROCEDURE — 36415 COLL VENOUS BLD VENIPUNCTURE: CPT

## 2022-01-04 PROCEDURE — 85379 FIBRIN DEGRADATION QUANT: CPT

## 2022-01-04 ASSESSMENT — PAIN SCALES - GENERAL: PAINLEVEL_OUTOF10: 8

## 2022-01-04 ASSESSMENT — PAIN DESCRIPTION - LOCATION: LOCATION: CHEST

## 2022-01-04 ASSESSMENT — PAIN DESCRIPTION - ORIENTATION: ORIENTATION: LEFT

## 2022-01-04 ASSESSMENT — PAIN DESCRIPTION - PAIN TYPE: TYPE: ACUTE PAIN

## 2022-01-04 ASSESSMENT — PAIN DESCRIPTION - DESCRIPTORS: DESCRIPTORS: DULL;DISCOMFORT

## 2022-01-04 NOTE — ED NOTES
Pt discharged from ED in stable condition. Discharge instruction explain, all question answered. Pt walked to lobby independently.       Seamus Lowery RN  01/04/22 1640

## 2022-01-04 NOTE — ED PROVIDER NOTES
[id number] [facility] RESIDENT NOTE       Date of evaluation: 1/4/2022    Chief Complaint     Chest Pain      of Present Illness     Radha Alonso is a 61 y.o. female past medical history of anemia, TIA, HTN, paroxysmal atrial fibrillation, and type 2 diabetes who presents to the ED for chest pain. Patient states her chest pain started last evening while watching TV, located in the left side of her chest, with a sharp sensation, with a mild-moderate intensity. Patient did not know any aggravating/alleviating factors and took a aspirin at that time for her pain. Patient went to bed, woke up, went to the bathroom and had recurrence of her chest pain and given her history of atrial fibrillation, she was worried about her heart and presented for further evaluation. Patient denies any associated lightheaded/dizziness, nausea/vomiting, cough, shortness of breath, abdominal pain, rash and lower extremity edema. She has otherwise felt well. Review of Systems     Denies lightheaded/dizziness, nausea/vomiting, cough, shortness of breath, abdominal pain, rash and lower extremity edema    All other systems were reviewed and were negative. Past Medical, Surgical, Family, and Social History     She has a past medical history of Anemia, Arthritis of knee, CVA (cerebral vascular accident) ([address] Heart disease, Hypertension, and Tobacco dependence. She has a past surgical history that includes ventral hernia repair; Tubal ligation; Tonsillectomy; joint replacement (Right); and pr corrj hallux valgus w/sesmdc w/2 osteot (Left, 10/26/2018). Her family history includes Cancer in her mother; Diabetes in her father; Heart Disease in her father; High Blood Pressure in her brother and father; Stroke in her paternal grandmother. She reports that she quit smoking about 6 months ago. Her smoking use included cigarettes. She has a 5.50 pack-year smoking history.  She has never used smokeless tobacco. She reports current alcohol use. She reports that she does not use drugs. Medications     Discharge Medication List as of 1/4/2022  4:35 PM      CONTINUE these medications which have NOT CHANGED    Details   albuterol sulfate  (90 Base) MCG/ACT inhaler INHALE 2 PUFF BY INHALATION ROUTE EVERY 4 - 6 HOURS AS NEEDED AS NEEDED FOR SHORTNESS OF BREATH, Disp-18 g, R-3Normal      TRUE METRIX BLOOD GLUCOSE TEST strip INSERT BY IN GLUCOMETER ROUTE EVERY DAY AS DIRECTED FOR DIABETES TESTING, Disp-50 strip, R-3Normal      melatonin 3 MG TABS tablet Take 3 mg by mouth dailyHistorical Med      furosemide (LASIX) 20 MG tablet Take 20 mg by mouth 2 times dailyHistorical Med      atorvastatin (LIPITOR) 20 MG tablet Take 2 tablets by mouth nightly, Disp-30 tablet, R-5Normal      losartan-hydroCHLOROthiazide (HYZAAR) 50-12.5 MG per tablet Take 1 tablet by mouth daily, Disp-30 tablet,R-3Normal      aspirin 325 MG EC tablet Take 1 tablet by mouth daily, Disp-30 tablet, R-3Normal      Cholecalciferol (VITAMIN D3) 2000 UNITS CAPS Take 1,000 Units by mouth daily Historical Med             Allergies     She is allergic to codeine, celebrex [celecoxib], clopidogrel, and gabapentin. Physical Exam     INITIAL VITALS: BP: (!) 190/105, Temp: 98.7 °F (37.1 °C), Pulse: 68, Resp: 17, SpO2: 99 %     General:   Age-appropriate female, obese, non-- toxic, sitting in the gurney no acute distress. Intermittently tearful. Eyes:  PERRL. EOMI. No discharge from eyes   ENT:  No discharge from nose. OP clear  Neck:  Supple, trachea midline  Pulmonary:   Non-labored breathing. Breath sounds clear bilaterally  Cardiac:  Regular rate and rhythm. No murmurs/rubs/gallops   Abdomen:  Soft. Non-tender. Non-distended. No rebound tenderness, guarding or masses. Musculoskeletal:  No long bone deformity. Vascular:  Extremities warm and perfused.  Normal pulses in all 4 extremities  Skin:  Dry, no rashes  Extremities:  No peripheral edema  Neuro:  Alert and oriented x4. Moves all four extremities to command. No focal deficit      DiagnosticResults     EKG   Interpreted in conjunction with emergencydepartment physician Anisha Kamara MD  Rhythm: normal sinus   Rate: normal  Axis: normal  Ectopy: none  Conduction: normal  ST Segments: normal  T Waves:flattening in  III  Q Waves: none  Clinical Impression: Normal sinus rhythm, T wave flattening in leads III, normal QRS/QTc intervals. Unchanged from prior. Comparison: 10/28/2020. RADIOLOGY:  XR CHEST (2 VW)   Final Result   Impression: No acute cardiopulmonary abnormality.           LABS:   Results for orders placed or performed during the hospital encounter of 01/04/22   CBC auto differential   Result Value Ref Range    WBC 5.1 4.0 - 11.0 K/uL    RBC 4.49 4.00 - 5.20 M/uL    Hemoglobin 13.2 12.0 - 16.0 g/dL    Hematocrit 39.3 36.0 - 48.0 %    MCV 87.5 80.0 - 100.0 fL    MCH 29.4 26.0 - 34.0 pg    MCHC 33.5 31.0 - 36.0 g/dL    RDW 14.6 12.4 - 15.4 %    Platelets 028 649 - 445 K/uL    MPV 8.2 5.0 - 10.5 fL    Neutrophils % 37.3 %    Lymphocytes % 50.6 %    Monocytes % 8.0 %    Eosinophils % 3.3 %    Basophils % 0.8 %    Neutrophils Absolute 1.9 1.7 - 7.7 K/uL    Lymphocytes Absolute 2.6 1.0 - 5.1 K/uL    Monocytes Absolute 0.4 0.0 - 1.3 K/uL    Eosinophils Absolute 0.2 0.0 - 0.6 K/uL    Basophils Absolute 0.0 0.0 - 0.2 K/uL   Basic Metabolic Panel w/ Reflex to MG   Result Value Ref Range    Sodium 139 136 - 145 mmol/L    Potassium reflex Magnesium 3.8 3.5 - 5.1 mmol/L    Chloride 105 99 - 110 mmol/L    CO2 25 21 - 32 mmol/L    Anion Gap 9 3 - 16    Glucose 88 70 - 99 mg/dL    BUN 13 7 - 20 mg/dL    CREATININE 0.8 0.6 - 1.2 mg/dL    GFR Non-African American >60 >60    GFR African American >60 >60    Calcium 9.4 8.3 - 10.6 mg/dL   Troponin (lab)   Result Value Ref Range    Troponin <0.01 <0.01 ng/mL   Troponin   Result Value Ref Range    Troponin <0.01 <0.01 ng/mL   SPECIMEN REJECTION   Result Value Ref Range    Rejected Test DIMER     Reason for Rejection see below    D-Dimer, Quantitative   Result Value Ref Range    D-Dimer, Quant <200 0 - 229 ng/mL DDU   EKG 12 Lead   Result Value Ref Range    Ventricular Rate 67 BPM    Atrial Rate 67 BPM    P-R Interval 172 ms    QRS Duration 94 ms    Q-T Interval 406 ms    QTc Calculation (Bazett) 429 ms    P Axis 63 degrees    R Axis 2 degrees    T Axis 35 degrees    Diagnosis       EKG performed in ER and to be interpreted by ER physician. Confirmed by MD, ER ([id number]),  Kalpesh Leggett (Jose Elias Pain)JANETH ([id number]) on 1/4/2022 2:10:02 PM       RECENT VITALS:  BP: (!) 190/105, Temp: 98.7 °F (37.1 °C), Pulse: 68,Resp: 17, SpO2: 99 %       ED Course     Nursing Notes, Past Medical Hx, Past Surgical Hx, Social Hx, Allergies, and Family Hx were reviewed. The patient was given the followingmedications:  No orders of the defined types were placed in this encounter. CONSULTS:  None    MEDICAL DECISION MAKING / ASSESSMENT / Dennise Alka is a 61 y.o. female who presents to the ED for chest pain. Patient was afebrile, well-appearing, with vital signs notable for a blood pressure of 190/105 on presentation. Patient's blood pressure subsequently improved after reassessment, systolic 354N. Low suspicion for any hypertensive emergency given absence of confusion, and no signs of endorgan damage on assessment. Patient presenting with an overall reassuring history, atypical chest pain that started yesterday evening, intermittent in nature. Low suspicion for ACS is component of patient's chest pain for presentation today, supported by an EKG without signs of ischemia or arrhythmia and a negative troponin x2. Low suspicion for pulmonary embolism, further supported by negative D-dimer. Chest x-ray without signs of pneumonia or pneumothorax. CBC anemia or leukocytosis and a BMP within normal limits. Patient does not appear to be in atrial fibrillation at this time.   Given patient's reassuring work-up, without signs/symptoms concerning for acute etiology of patient's chest pain, she was deemed appropriate for discharge. Patient had a heart score of 3, making her low risk for Mace event. She was instructed to follow-up with her primary care physician and given strict return precautions. This patient was also evaluated by the attending physician. All care plans werediscussed and agreed upon. At this time, the patient was deemed appropriate for discharge. My customary discharge instructions, including strict return precautions for new or worsening symptoms concerning to the patient, were provided. All of patient's questions were answered satisfactorily, and she was subsequently sent home in stable condition. Clinical Impression     1.  Chest pain, unspecified type        Disposition     PATIENT REFERRED TO:  LYNN Castellanos - CNP  [address]  [phone]    Schedule an appointment as soon as possible for a visit in 1 week  For follow up of today's visit      DISCHARGE MEDICATIONS:  Discharge Medication List as of 1/4/2022  4:35 PM          DISPOSITION Decision To Discharge 01/04/2022 04:32:21 PM      Alana Slater MD  01/04/22 0427

## 2022-01-05 NOTE — FLOWSHEET NOTE
01/04/22 1333   Encounter Summary   Services provided to: Patient   Referral/Consult From: Stanislav   Continue Visiting   (1/5/2022, HARIS. )   Complexity of Encounter Moderate   Length of Encounter 15 minutes   Routine   Type Initial   Assessment Approachable   Intervention Nurtured hope   Outcome Expressed gratitude

## 2022-01-17 ASSESSMENT — ENCOUNTER SYMPTOMS
DIARRHEA: 0
SHORTNESS OF BREATH: 0
ABDOMINAL PAIN: 0
CHEST TIGHTNESS: 0
CONSTIPATION: 0

## 2022-01-17 NOTE — PROGRESS NOTES
Savi Pressley (:  1955) is a 61 y.o. female,Established patient, here for evaluation of the following chief complaint(s):  Follow-Up from Hospital         ASSESSMENT/PLAN:  1. Uncontrolled type 2 diabetes mellitus with complication, without long-term current use of insulin ([facility] [address]- well controlled  -     Diabetic Foot Exam  -Maintain healthy life style changes  2. Obesity, diabetes, and hypertension syndrome (HCC)  -    Continue Hyzaar  -Continue Lasix  - Diabetic Foot Exam  -The patient is advised to begin progressive daily aerobic exercise program, follow a low fat, low cholesterol diet, attempt to lose weight, reduce salt in diet and cooking, reduce exposure to stress, continue current medications, continue current healthy lifestyle patterns and return for routine annual checkups. 3. Type 2 diabetes mellitus with hyperlipidemia ([facility] [address]  -  Continue Zocor  -   Diabetic Foot Exam      Return in about 6 months (around 2022) for DIABETES, HTN. Subjective   SUBJECTIVE/OBJECTIVE:  HPI    Evaluated in emergency department for chest pain. No abnormalities revealed EKG, chest x-ray or labs. Had dental procedure, threw away medicated mouthwash. Hypertension  This is a chronic problem. The current episode started more than 1 year ago. The problem is controlled. Pertinent negatives include no chest pain, headaches, palpitations or shortness of breath. There are no associated agents to hypertension. Risk factors for coronary artery disease include dyslipidemia, diabetes mellitus and obesity. Past treatments include diuretics, angiotensin blockers and lifestyle changes. The current treatment provides significant improvement. Compliance problems include diet and exercise.    Hyperlipidemia  This is a chronic problem. Factors aggravating her hyperlipidemia include smoking and thiazides. Pertinent negatives include no chest pain, myalgias or shortness of breath.  Current antihyperlipidemic treatment includes diet change, exercise and statins. The current treatment provides moderate improvement of lipids. Compliance problems include adherence to diet and adherence to exercise.  Risk factors for coronary artery disease include hypertension, obesity, post-menopausal, dyslipidemia and diabetes mellitus. States she forgets to take the medication. Diabetes A1C 6.1% 11/1/2021  She presents for her follow-up diabetic visit. She has type 2 diabetes mellitus. There are no hypoglycemic associated symptoms. Pertinent negatives for hypoglycemia include no dizziness, headaches or nervousness/anxiousness. Pertinent negatives for diabetes include no chest pain and no polyuria. There are no hypoglycemic complications. Symptoms are stable. There are no diabetic complications. Risk factors for coronary artery disease include diabetes mellitus, dyslipidemia, family history, obesity, post-menopausal and tobacco exposure. Current diabetic treatment includes diet. She is compliant with treatment some of the time. She is following a generally unhealthy diet. An ACE inhibitor/angiotensin II receptor blocker is being taken. brought  Blood sugar readings to appointment: .      History of TIA's  Has had multiple TIA's, most recent in 10/2020. 10 day monitor revealed  SR with unifocal PVC's but no atrial arrhythmias. Followed by Cardiac electrophysiology. Patient is current everyday smoker.     Shortness of breath  States the albuterol inhaler she has is not effective. She demonstrated use of inhaler. States it goes onto her tongue. Wants inhaler device with a larger opening. Had dental work on Thursday, gums hurt, threw out mouth wash.      Reporting some neck stiffness intermittently. Podiatry appointment every 3 months, due the end of January.   Visual inspection:  Deformity/amputation: absent  Skin lesions/pre-ulcerative calluses: absent  Edema: right- negative, left- negative    Sensory exam:  Monofilament sensation: normal  (minimum of 5 random plantar locations tested, avoiding callused areas - > 1 area with absence of sensation is + for neuropathy)    Plus at least one of the following:  Pulses: normal,   Pinprick: Intact  Proprioception: N/A  Vibration (128 Hz): N/A     Obesity  General weight loss/lifestyle modification strategies discussed (elicit support from others; identify saboteurs; non-food rewards, etc).     Goals:   -Eat 4-5 times daily  -Avoid high fat and high sugar foods  -Include protein with all meals and snacks  -Avoid carbonation and caffeine  -Avoid calorie containing beverages  -Increase physical activity as tolerated     Overweight/Obesity related to lack of exercise, sedentary lifestyle, unhealthy eating habits, and unsuccessful diet attempts as evidenced by BMI.     BMI:38.37  Wt Readings from Last 3 Encounters:   01/18/22 245 lb (111.1 kg)   01/04/22 234 lb (106.1 kg)   11/11/21 252 lb 9.6 oz (114.6 kg)     Review of Systems   Constitutional: Negative for activity change and fever. HENT: Negative for congestion. Tonsillectomy   Eyes: Negative for visual disturbance. Respiratory: Negative for chest tightness and shortness of breath. Cardiovascular: Negative for chest pain, palpitations and leg swelling. Hypertension, hyperlipidemia    12/12/2019 Echo:   Normal left ventricle size, wall thickness, and systolic function with an   estimated ejection fraction of 55-60%. Gastrointestinal: Negative for abdominal pain, constipation and diarrhea. Endocrine: Negative for polyuria. Genitourinary: Negative for dysuria. Tubal Ligation   Musculoskeletal: Negative for arthralgias and myalgias. Left rotator cuff tendinitis  TKA right knee   Skin: Negative for rash. Neurological: Negative for dizziness, light-headedness and headaches. History of multiple TIA's and CVA   Psychiatric/Behavioral: Negative for agitation, decreased concentration and sleep disturbance.  The patient is not nervous/anxious. Objective    Past Medical History:   Diagnosis Date    Anemia     Arthritis of knee     CVA (cerebral vascular accident) ([address]     left side weakness    Heart disease     ? TYPE    Hypertension     Tobacco dependence      Past Surgical History:   Procedure Laterality Date    JOINT REPLACEMENT Right     Right knee    DC CORRJ HALLUX VALGUS W/SESMDC W/2 OSTEOT Left 10/26/2018    MODIFIED YEN BUNIONECTOMY WITH FIRST METATARSAL OSTEOTOMY WITH  ANNALISA OSTEOTOMY, LEFT FOOT performed by Umm Roque DPM at [place] [address]      VENTRAL HERNIA REPAIR       Social History     Tobacco Use    Smoking status: Former Smoker     Packs/day: 0.25     Years: 22.00     Pack years: 5.50     Types: Cigarettes     Quit date: 2021     Years since quittin.5    Smokeless tobacco: Never Used    Tobacco comment: 3-4 cigarettes per day   Vaping Use    Vaping Use: Never used   Substance Use Topics    Alcohol use: Yes     Comment: OCC BEER    Drug use: No     Outpatient Encounter Medications as of 2022   Medication Sig Dispense Refill    simvastatin (ZOCOR) 10 MG tablet daily      oxyCODONE-acetaminophen (PERCOCET) 5-325 MG per tablet as needed.       Omega-3 Fatty Acids (FISH OIL) 1000 MG CAPS daily      Multiple Vitamin (MULTIVITAMIN) TABS daily      ipratropium (ATROVENT) 0.06 % nasal spray       chlorhexidine (PERIDEX) 0.12 % solution Take 15 mLs by mouth 2 times daily for 14 days 420 mL 0    nystatin (MYCOSTATIN) 801445 UNIT/ML suspension TAKE 5 MILLILITER BY ORAL ROUTE 4 TIMES EVERY  mL 5    albuterol sulfate  (90 Base) MCG/ACT inhaler INHALE 2 PUFF BY INHALATION ROUTE EVERY 4 - 6 HOURS AS NEEDED AS NEEDED FOR SHORTNESS OF BREATH 18 g 3    TRUE METRIX BLOOD GLUCOSE TEST strip INSERT BY IN GLUCOMETER ROUTE EVERY DAY AS DIRECTED FOR DIABETES TESTING 50 strip 3    furosemide (LASIX) 20 MG tablet Take 20 mg by mouth 2 times daily      atorvastatin (LIPITOR) 20 MG tablet Take 2 tablets by mouth nightly 30 tablet 5    losartan-hydroCHLOROthiazide (HYZAAR) 50-12.5 MG per tablet Take 1 tablet by mouth daily 30 tablet 3    Cholecalciferol (VITAMIN D3) 2000 UNITS CAPS Take 1,000 Units by mouth daily       BANOPHEN 25 MG tablet nightly (Patient not taking: Reported on 1/18/2022)      [DISCONTINUED] chlorhexidine (PERIDEX) 0.12 % solution       melatonin 3 MG TABS tablet Take 3 mg by mouth daily (Patient not taking: Reported on 1/18/2022)      aspirin 325 MG EC tablet Take 1 tablet by mouth daily 30 tablet 3     No facility-administered encounter medications on file as of 1/18/2022.    height is 5' 7\" (1.702 m) and weight is 245 lb (111.1 kg). Her temporal temperature is 97.1 °F (36.2 °C). Her blood pressure is 107/68 and her pulse is 66. Her respiration is 16 and oxygen saturation is 98%. Physical Exam  Vitals reviewed. Constitutional:       Appearance: She is well-developed. HENT:      Head: Normocephalic. Right Ear: Hearing and external ear normal.      Left Ear: Hearing and external ear normal.      Nose: Nose normal.   Eyes:      General: Lids are normal.   Cardiovascular:      Rate and Rhythm: Normal rate and regular rhythm. Heart sounds: Normal heart sounds, S1 normal and S2 normal.   Pulmonary:      Effort: Pulmonary effort is normal.      Breath sounds: Normal breath sounds. Musculoskeletal:         General: Normal range of motion. Skin:     General: Skin is warm and dry. Findings: No rash. Neurological:      Mental Status: She is alert and oriented to person, place, and time. GCS: GCS eye subscore is 4. GCS verbal subscore is 5. GCS motor subscore is 6. Psychiatric:         Speech: Speech normal.         Behavior: Behavior normal. Behavior is cooperative.             On this date 1/18/2022 I have spent 25 minutes reviewing previous notes, test results and face to face with the patient discussing the diagnosis and importance of compliance with the treatment plan as well as documenting on the day of the visit. An electronic signature was used to authenticate this note.     --Candelario Albertor, LYNN - CNP

## 2022-01-18 ENCOUNTER — OFFICE VISIT (OUTPATIENT)
Dept: PRIMARY CARE CLINIC | Age: 67
End: 2022-01-18
Payer: MEDICAID

## 2022-01-18 VITALS
BODY MASS INDEX: 38.45 KG/M2 | RESPIRATION RATE: 16 BRPM | DIASTOLIC BLOOD PRESSURE: 68 MMHG | TEMPERATURE: 97.1 F | HEIGHT: 67 IN | OXYGEN SATURATION: 98 % | HEART RATE: 66 BPM | WEIGHT: 245 LBS | SYSTOLIC BLOOD PRESSURE: 107 MMHG

## 2022-01-18 DIAGNOSIS — E11.69 OBESITY, DIABETES, AND HYPERTENSION SYNDROME (HCC): ICD-10-CM

## 2022-01-18 DIAGNOSIS — I15.2 OBESITY, DIABETES, AND HYPERTENSION SYNDROME (HCC): ICD-10-CM

## 2022-01-18 DIAGNOSIS — E11.59 OBESITY, DIABETES, AND HYPERTENSION SYNDROME (HCC): ICD-10-CM

## 2022-01-18 DIAGNOSIS — E66.9 OBESITY, DIABETES, AND HYPERTENSION SYNDROME (HCC): ICD-10-CM

## 2022-01-18 DIAGNOSIS — E78.5 TYPE 2 DIABETES MELLITUS WITH HYPERLIPIDEMIA (HCC): ICD-10-CM

## 2022-01-18 DIAGNOSIS — E11.69 TYPE 2 DIABETES MELLITUS WITH HYPERLIPIDEMIA (HCC): ICD-10-CM

## 2022-01-18 PROCEDURE — 1036F TOBACCO NON-USER: CPT | Performed by: NURSE PRACTITIONER

## 2022-01-18 PROCEDURE — 2022F DILAT RTA XM EVC RTNOPTHY: CPT | Performed by: NURSE PRACTITIONER

## 2022-01-18 PROCEDURE — G8482 FLU IMMUNIZE ORDER/ADMIN: HCPCS | Performed by: NURSE PRACTITIONER

## 2022-01-18 PROCEDURE — G8427 DOCREV CUR MEDS BY ELIG CLIN: HCPCS | Performed by: NURSE PRACTITIONER

## 2022-01-18 PROCEDURE — 3046F HEMOGLOBIN A1C LEVEL >9.0%: CPT | Performed by: NURSE PRACTITIONER

## 2022-01-18 PROCEDURE — 3017F COLORECTAL CA SCREEN DOC REV: CPT | Performed by: NURSE PRACTITIONER

## 2022-01-18 PROCEDURE — 99214 OFFICE O/P EST MOD 30 MIN: CPT | Performed by: NURSE PRACTITIONER

## 2022-01-18 PROCEDURE — G8417 CALC BMI ABV UP PARAM F/U: HCPCS | Performed by: NURSE PRACTITIONER

## 2022-01-18 RX ORDER — OXYCODONE HYDROCHLORIDE AND ACETAMINOPHEN 5; 325 MG/1; MG/1
TABLET ORAL PRN
COMMUNITY
Start: 2022-01-13 | End: 2022-02-24

## 2022-01-18 RX ORDER — CHLORHEXIDINE GLUCONATE 0.12 MG/ML
RINSE ORAL
COMMUNITY
Start: 2022-01-13 | End: 2022-01-18 | Stop reason: SDUPTHER

## 2022-01-18 RX ORDER — CHLORHEXIDINE GLUCONATE 0.12 MG/ML
15 RINSE ORAL 2 TIMES DAILY
Qty: 420 ML | Refills: 0 | Status: SHIPPED | OUTPATIENT
Start: 2022-01-18 | End: 2022-02-01

## 2022-01-18 RX ORDER — IPRATROPIUM BROMIDE 42 UG/1
SPRAY, METERED NASAL
COMMUNITY
Start: 2021-12-29

## 2022-01-18 RX ORDER — MULTIVITAMIN
TABLET ORAL DAILY
COMMUNITY
Start: 2021-12-29 | End: 2022-02-24 | Stop reason: SDUPTHER

## 2022-01-18 RX ORDER — SIMVASTATIN 10 MG
TABLET ORAL DAILY
COMMUNITY
Start: 2021-12-29 | End: 2022-02-24 | Stop reason: SDUPTHER

## 2022-01-18 RX ORDER — DIPHENHYDRAMINE HCL 25 MG/1
TABLET ORAL NIGHTLY
COMMUNITY
Start: 2021-10-20 | End: 2022-01-20

## 2022-01-18 RX ORDER — CHLORAL HYDRATE 500 MG
CAPSULE ORAL DAILY
COMMUNITY
Start: 2021-12-29 | End: 2022-02-24 | Stop reason: SDUPTHER

## 2022-01-20 PROBLEM — I15.2 OBESITY, DIABETES, AND HYPERTENSION SYNDROME (HCC): Status: ACTIVE | Noted: 2022-01-20

## 2022-01-20 PROBLEM — E11.59 OBESITY, DIABETES, AND HYPERTENSION SYNDROME (HCC): Status: ACTIVE | Noted: 2022-01-20

## 2022-01-20 PROBLEM — E11.69 OBESITY, DIABETES, AND HYPERTENSION SYNDROME (HCC): Status: ACTIVE | Noted: 2022-01-20

## 2022-01-20 PROBLEM — E11.69 TYPE 2 DIABETES MELLITUS WITH HYPERLIPIDEMIA (HCC): Status: ACTIVE | Noted: 2022-01-20

## 2022-01-20 PROBLEM — E66.9 OBESITY, DIABETES, AND HYPERTENSION SYNDROME (HCC): Status: ACTIVE | Noted: 2022-01-20

## 2022-01-20 PROBLEM — E78.5 TYPE 2 DIABETES MELLITUS WITH HYPERLIPIDEMIA (HCC): Status: ACTIVE | Noted: 2022-01-20

## 2022-02-21 RX ORDER — CHLORHEXIDINE GLUCONATE 0.12 MG/ML
15 RINSE ORAL 2 TIMES DAILY
Qty: 473 ML | Refills: 11 | OUTPATIENT
Start: 2022-02-21 | End: 2022-03-07

## 2022-02-21 NOTE — TELEPHONE ENCOUNTER
Medication:   Requested Prescriptions     Pending Prescriptions Disp Refills    chlorhexidine (PERIDEX) 0.12 % solution [Pharmacy Med Name: CHLORHEXIDINE 0.12% RINSE 0.12 Solution] 473 mL 11     Sig: TAKE 15 MLS BY MOUTH 2 TIMES DAILY FOR 14 DAYS        Last Filled:      Patient Phone Number: [phone] (home)     Last appt: 1/18/2022   Next appt: 5/11/2022    Last OARRS: No flowsheet data found.

## 2022-02-24 ENCOUNTER — TELEPHONE (OUTPATIENT)
Dept: PRIMARY CARE CLINIC | Age: 67
End: 2022-02-24

## 2022-02-24 ENCOUNTER — TELEMEDICINE (OUTPATIENT)
Dept: PRIMARY CARE CLINIC | Age: 67
End: 2022-02-24
Payer: MEDICAID

## 2022-02-24 DIAGNOSIS — E11.69 OBESITY, DIABETES, AND HYPERTENSION SYNDROME (HCC): Primary | ICD-10-CM

## 2022-02-24 DIAGNOSIS — E78.5 TYPE 2 DIABETES MELLITUS WITH HYPERLIPIDEMIA (HCC): ICD-10-CM

## 2022-02-24 DIAGNOSIS — E11.69 TYPE 2 DIABETES MELLITUS WITH HYPERLIPIDEMIA (HCC): ICD-10-CM

## 2022-02-24 DIAGNOSIS — I15.2 OBESITY, DIABETES, AND HYPERTENSION SYNDROME (HCC): Primary | ICD-10-CM

## 2022-02-24 DIAGNOSIS — E66.9 OBESITY, DIABETES, AND HYPERTENSION SYNDROME (HCC): Primary | ICD-10-CM

## 2022-02-24 DIAGNOSIS — E11.59 OBESITY, DIABETES, AND HYPERTENSION SYNDROME (HCC): Primary | ICD-10-CM

## 2022-02-24 PROCEDURE — G8417 CALC BMI ABV UP PARAM F/U: HCPCS | Performed by: NURSE PRACTITIONER

## 2022-02-24 PROCEDURE — 1036F TOBACCO NON-USER: CPT | Performed by: NURSE PRACTITIONER

## 2022-02-24 PROCEDURE — 2022F DILAT RTA XM EVC RTNOPTHY: CPT | Performed by: NURSE PRACTITIONER

## 2022-02-24 PROCEDURE — 99213 OFFICE O/P EST LOW 20 MIN: CPT | Performed by: NURSE PRACTITIONER

## 2022-02-24 PROCEDURE — 3046F HEMOGLOBIN A1C LEVEL >9.0%: CPT | Performed by: NURSE PRACTITIONER

## 2022-02-24 PROCEDURE — G8482 FLU IMMUNIZE ORDER/ADMIN: HCPCS | Performed by: NURSE PRACTITIONER

## 2022-02-24 PROCEDURE — G8427 DOCREV CUR MEDS BY ELIG CLIN: HCPCS | Performed by: NURSE PRACTITIONER

## 2022-02-24 PROCEDURE — 3017F COLORECTAL CA SCREEN DOC REV: CPT | Performed by: NURSE PRACTITIONER

## 2022-02-24 RX ORDER — CALCIUM CITRATE/VITAMIN D3 200MG-6.25
TABLET ORAL
Qty: 50 STRIP | Refills: 11 | Status: SHIPPED | OUTPATIENT
Start: 2022-02-24 | End: 2022-10-14

## 2022-02-24 RX ORDER — SIMVASTATIN 10 MG
10 TABLET ORAL DAILY
Qty: 90 TABLET | Refills: 1 | Status: SHIPPED | OUTPATIENT
Start: 2022-02-24 | End: 2022-08-24

## 2022-02-24 RX ORDER — LOSARTAN POTASSIUM AND HYDROCHLOROTHIAZIDE 12.5; 5 MG/1; MG/1
1 TABLET ORAL DAILY
Qty: 90 TABLET | Refills: 1 | Status: SHIPPED | OUTPATIENT
Start: 2022-02-24 | End: 2022-08-01 | Stop reason: SINTOL

## 2022-02-24 RX ORDER — ALBUTEROL SULFATE 90 UG/1
AEROSOL, METERED RESPIRATORY (INHALATION)
Qty: 18 G | Refills: 3 | Status: SHIPPED | OUTPATIENT
Start: 2022-02-24

## 2022-02-24 RX ORDER — ALBUTEROL SULFATE 90 UG/1
AEROSOL, METERED RESPIRATORY (INHALATION)
Qty: 18 G | Refills: 11 | Status: SHIPPED | OUTPATIENT
Start: 2022-02-24 | End: 2022-02-24 | Stop reason: SDUPTHER

## 2022-02-24 RX ORDER — MULTIVITAMIN
1 TABLET ORAL DAILY
Qty: 90 TABLET | Refills: 1 | Status: SHIPPED | OUTPATIENT
Start: 2022-02-24 | End: 2022-09-27

## 2022-02-24 RX ORDER — FUROSEMIDE 20 MG/1
20 TABLET ORAL 2 TIMES DAILY
Qty: 60 TABLET | Refills: 3 | Status: SHIPPED | OUTPATIENT
Start: 2022-02-24 | End: 2022-06-27

## 2022-02-24 RX ORDER — CHLORAL HYDRATE 500 MG
1000 CAPSULE ORAL DAILY
Qty: 90 CAPSULE | Refills: 3 | Status: SHIPPED | OUTPATIENT
Start: 2022-02-24

## 2022-02-24 ASSESSMENT — ENCOUNTER SYMPTOMS
SHORTNESS OF BREATH: 0
DIARRHEA: 0
CONSTIPATION: 0
CHEST TIGHTNESS: 0
ABDOMINAL PAIN: 0

## 2022-02-24 NOTE — TELEPHONE ENCOUNTER
Medication:   Requested Prescriptions     Pending Prescriptions Disp Refills    TRUE METRIX BLOOD GLUCOSE TEST strip [Pharmacy Med Name: TRUE METRIX GLUCOSE TEST ST Strip] 50 strip 11     Sig: INSERT BY IN GLUCOMETER ROUTE EVERY DAY AS DIRECTED FOR DIABETES TESTING    albuterol sulfate  (90 Base) MCG/ACT inhaler [Pharmacy Med Name: ALBUTEROL SULFATE  ( 108 (90 BAS Aerosol] 18 g 11     Sig: INHALE 2 PUFF BY INHALATION ROUTE EVERY 4 - 6 HOURS AS NEEDED AS NEEDED FOR SHORTNESS OF BREATH        Last Filled:      Patient Phone Number: [phone] (home)     Last appt: 1/18/2022   Next appt: 5/11/2022    Last OARRS: No flowsheet data found.

## 2022-02-24 NOTE — TELEPHONE ENCOUNTER
Medication refill on losartan-hydroCHLOROthiazide (HYZAAR) 50-12.5 MG, furosemide (LASIX) 20 MG tablet, simvastatin (ZOCOR) 10 MG, Multiple Vitamin (MULTIVITAMIN) TABS, aspirin 325 MG EC tablet  &  Omega-3 Fatty Acids (FISH OIL) 1000 MG CAPS, please send to [address]

## 2022-02-24 NOTE — PROGRESS NOTES
Bertha Ware (:  1955) is a Established patient, here for evaluation of the following:    Assessment & Plan   Below is the assessment and plan developed based on review of pertinent history, physical exam, labs, studies, and medications. 1. Obesity, diabetes, and hypertension syndrome ([address]  -continue Hyzaar  -continue Lasix  -Maintain healthy life style changes  2. Type 2 diabetes mellitus with hyperlipidemia (HCC)  -  Continue   simvastatin (ZOCOR) 10 MG tablet; Take 1 tablet by mouth daily, Disp-90 tablet, R-1Normal  -     Omega-3 Fatty Acids (FISH OIL) 1000 MG CAPS; Take 1 capsule by mouth daily, Disp-90 capsule, R-3Normal  -Maintain healthy life style changes    No follow-ups on file. Subjective   HPI   Hypertension  This is a chronic problem. The current episode started more than 1 year ago. The problem is controlled. Pertinent negatives include no chest pain, headaches, palpitations or shortness of breath. There are no associated agents to hypertension. Risk factors for coronary artery disease include dyslipidemia, diabetes mellitus and obesity. Past treatments include diuretics, angiotensin blockers and lifestyle changes. The current treatment provides significant improvement. Compliance problems include diet and exercise.    Hyperlipidemia  This is a chronic problem. Factors aggravating her hyperlipidemia include smoking and thiazides. Pertinent negatives include no chest pain, myalgias or shortness of breath. Current antihyperlipidemic treatment includes diet change, exercise and statins. The current treatment provides moderate improvement of lipids. Compliance problems include adherence to diet and adherence to exercise.  Risk factors for coronary artery disease include hypertension, obesity, post-menopausal, dyslipidemia and diabetes mellitus. States she forgets to take the medication. Diabetes A1C 6.1% 2021  She presents for her follow-up diabetic visit.  She has type 2 diabetes mellitus. There are no hypoglycemic associated symptoms. Pertinent negatives for hypoglycemia include no dizziness, headaches or nervousness/anxiousness. Pertinent negatives for diabetes include no chest pain and no polyuria. There are no hypoglycemic complications. Symptoms are stable. There are no diabetic complications. Risk factors for coronary artery disease include diabetes mellitus, dyslipidemia, family history, obesity, post-menopausal and tobacco exposure. Current diabetic treatment includes diet. She is compliant with treatment some of the time. She is following a generally unhealthy diet. An ACE inhibitor/angiotensin II receptor blocker is being taken. brought  Blood sugar readings to appointment: .      History of TIA's  Has had multiple TIA's, most recent in 10/2020. 10 day monitor revealed  SR with unifocal PVC's but no atrial arrhythmias. Followed by Cardiac electrophysiology. Patient is current everyday smoker.     Shortness of breath  States the albuterol inhaler she has is not effective. She demonstrated use of inhaler. States it goes onto her tongue. Wants inhaler device with a larger opening.      Had dental work on Thursday, gums hurt, threw out mouth wash.      Reporting some neck stiffness intermittently. Obesity  General weight loss/lifestyle modification strategies discussed (elicit support from others; identify saboteurs; non-food rewards, etc).     Goals:   -Eat 4-5 times daily  -Avoid high fat and high sugar foods  -Include protein with all meals and snacks  -Avoid carbonation and caffeine  -Avoid calorie containing beverages  -Increase physical activity as tolerated     Overweight/Obesity related to lack of exercise, sedentary lifestyle, unhealthy eating habits, and unsuccessful diet attempts as evidenced by BMI. Review of Systems   Constitutional: Negative for activity change and fever. HENT: Negative for congestion.          Tonsillectomy   Eyes: Negative for visual disturbance. Respiratory: Negative for chest tightness and shortness of breath. Cardiovascular: Negative for chest pain, palpitations and leg swelling. Hypertension, hyperlipidemia    12/12/2019 Echo:   Normal left ventricle size, wall thickness, and systolic function with an   estimated ejection fraction of 55-60%. Gastrointestinal: Negative for abdominal pain, constipation and diarrhea. Endocrine: Negative for polyuria. Genitourinary: Negative for dysuria. Tubal Ligation   Musculoskeletal: Negative for arthralgias and myalgias. Left rotator cuff tendinitis  TKA right knee   Skin: Negative for rash. Neurological: Negative for dizziness, light-headedness and headaches. History of multiple TIA's and CVA   Psychiatric/Behavioral: Negative for agitation, decreased concentration and sleep disturbance. The patient is not nervous/anxious. Objective    Past Medical History:   Diagnosis Date    Anemia     Arthritis of knee     CVA (cerebral vascular accident) ([facility] [address]     left side weakness    Heart disease     ?  TYPE    Hypertension     Tobacco dependence      Past Surgical History:   Procedure Laterality Date    JOINT REPLACEMENT Right     Right knee    LA CORRJ HALLUX VALGUS W/SESMDC W/2 OSTEOT Left 10/26/2018    MODIFIED YEN BUNIONECTOMY WITH FIRST METATARSAL OSTEOTOMY WITH  ANNALISA OSTEOTOMY, LEFT FOOT performed by Bran House DPM at [address] [facility]       Outpatient Encounter Medications as of 2/24/2022   Medication Sig Dispense Refill    TRUE METRIX BLOOD GLUCOSE TEST strip INSERT BY IN GLUCOMETER ROUTE EVERY DAY AS DIRECTED FOR DIABETES TESTING 50 strip 11    simvastatin (ZOCOR) 10 MG tablet Take 1 tablet by mouth daily 90 tablet 1    furosemide (LASIX) 20 MG tablet Take 1 tablet by mouth 2 times daily 60 tablet 3    losartan-hydroCHLOROthiazide (HYZAAR) 50-12.5 MG per tablet Take 1 tablet by mouth daily 90 tablet 1    Multiple Vitamin (MULTIVITAMIN) TABS Take 1 tablet by mouth daily 90 tablet 1    Omega-3 Fatty Acids (FISH OIL) 1000 MG CAPS Take 1 capsule by mouth daily 90 capsule 3    albuterol sulfate  (90 Base) MCG/ACT inhaler INHALE 2 PUFF BY INHALATION ROUTE EVERY 4 - 6 HOURS AS NEEDED AS NEEDED FOR SHORTNESS OF BREATH 18 g 3    ipratropium (ATROVENT) 0.06 % nasal spray       nystatin (MYCOSTATIN) 973379 UNIT/ML suspension TAKE 5 MILLILITER BY ORAL ROUTE 4 TIMES EVERY  mL 5    Cholecalciferol (VITAMIN D3) 2000 UNITS CAPS Take 1,000 Units by mouth daily       [DISCONTINUED] albuterol sulfate  (90 Base) MCG/ACT inhaler INHALE 2 PUFF BY INHALATION ROUTE EVERY 4 - 6 HOURS AS NEEDED AS NEEDED FOR SHORTNESS OF BREATH 18 g 11    [DISCONTINUED] simvastatin (ZOCOR) 10 MG tablet daily      [DISCONTINUED] oxyCODONE-acetaminophen (PERCOCET) 5-325 MG per tablet as needed.  [DISCONTINUED] Omega-3 Fatty Acids (FISH OIL) 1000 MG CAPS daily      [DISCONTINUED] Multiple Vitamin (MULTIVITAMIN) TABS daily      [DISCONTINUED] furosemide (LASIX) 20 MG tablet Take 20 mg by mouth 2 times daily      [DISCONTINUED] losartan-hydroCHLOROthiazide (HYZAAR) 50-12.5 MG per tablet Take 1 tablet by mouth daily 30 tablet 3    aspirin 325 MG EC tablet Take 1 tablet by mouth daily 30 tablet 3     No facility-administered encounter medications on file as of 2/24/2022. Patient-Reported Vitals  No data recorded     Physical exam limited due to virtual visit  Physical Exam  Constitutional:       Appearance: Normal appearance. She is well-developed and well-groomed. HENT:      Head: Normocephalic. Right Ear: Hearing normal.      Left Ear: Hearing normal.   Eyes:      General: Lids are normal. Vision grossly intact. Pulmonary:      Effort: Pulmonary effort is normal.   Neurological:      General: No focal deficit present.       Mental Status: She is alert and oriented to person, place, and time. GCS: GCS eye subscore is 4. GCS verbal subscore is 5. GCS motor subscore is 6. Psychiatric:         Behavior: Behavior is cooperative. On this date 2/24/2022 I have spent 20 minutes reviewing previous notes, test results and face to face (virtual) with the patient discussing the diagnosis and importance of compliance with the treatment plan as well as documenting on the day of the visit. Lamberto Verma, was evaluated through a synchronous (real-time) audio-video encounter. The patient (or guardian if applicable) is aware that this is a billable service, which includes applicable co-pays. This Virtual Visit was conducted with patient's (and/or legal guardian's) consent. The visit was conducted pursuant to the emergency declaration under the [address] [place] [address], [address] [place] [address] waiver authority and the [employer] General Act. Patient identification was verified, and a caregiver was present when appropriate. The patient was located at home in a state where the provider was licensed to provide care.        --LYNN Foreman - CNP

## 2022-02-24 NOTE — TELEPHONE ENCOUNTER
Please call to verify the status of the previous request for the previous msg. Please return call to verify the status. Thank you!   [phone]

## 2022-03-04 ASSESSMENT — VISUAL ACUITY: OU: 1

## 2022-04-14 ENCOUNTER — TELEPHONE (OUTPATIENT)
Dept: PRIMARY CARE CLINIC | Age: 67
End: 2022-04-14

## 2022-05-02 ENCOUNTER — HOSPITAL ENCOUNTER (EMERGENCY)
Age: 67
Discharge: HOME | End: 2022-05-02
Attending: EMERGENCY MEDICINE
Payer: MEDICAID

## 2022-05-02 VITALS
WEIGHT: 245 LBS | BODY MASS INDEX: 38.37 KG/M2 | SYSTOLIC BLOOD PRESSURE: 121 MMHG | DIASTOLIC BLOOD PRESSURE: 96 MMHG | HEART RATE: 55 BPM | TEMPERATURE: 98 F | RESPIRATION RATE: 18 BRPM | OXYGEN SATURATION: 100 %

## 2022-05-02 DIAGNOSIS — T78.40XA ALLERGIC REACTION, INITIAL ENCOUNTER: Primary | ICD-10-CM

## 2022-05-02 PROCEDURE — 99283 EMERGENCY DEPT VISIT LOW MDM: CPT

## 2022-05-02 PROCEDURE — 6370000000 HC RX 637 (ALT 250 FOR IP): Performed by: EMERGENCY MEDICINE

## 2022-05-02 RX ORDER — METHYLPREDNISOLONE 4 MG/1
TABLET ORAL
Qty: 1 KIT | Refills: 0 | Status: SHIPPED | OUTPATIENT
Start: 2022-05-02 | End: 2022-05-08

## 2022-05-02 RX ORDER — CETIRIZINE HYDROCHLORIDE 10 MG/1
10 TABLET ORAL DAILY
Qty: 30 TABLET | Refills: 0 | Status: SHIPPED | OUTPATIENT
Start: 2022-05-02 | End: 2022-05-17 | Stop reason: SDUPTHER

## 2022-05-02 RX ORDER — PREDNISONE 20 MG/1
20 TABLET ORAL ONCE
Status: COMPLETED | OUTPATIENT
Start: 2022-05-02 | End: 2022-05-02

## 2022-05-02 RX ORDER — EPINEPHRINE 0.3 MG/.3ML
0.3 INJECTION SUBCUTANEOUS ONCE
Qty: 0.3 ML | Refills: 0 | Status: SHIPPED | OUTPATIENT
Start: 2022-05-02 | End: 2022-05-02

## 2022-05-02 RX ORDER — DIPHENHYDRAMINE HCL 25 MG
50 TABLET ORAL ONCE
Status: COMPLETED | OUTPATIENT
Start: 2022-05-02 | End: 2022-05-02

## 2022-05-02 RX ADMIN — PREDNISONE 20 MG: 20 TABLET ORAL at 18:54

## 2022-05-02 RX ADMIN — DIPHENHYDRAMINE HYDROCHLORIDE 50 MG: 25 TABLET ORAL at 18:54

## 2022-05-02 ASSESSMENT — PAIN - FUNCTIONAL ASSESSMENT: PAIN_FUNCTIONAL_ASSESSMENT: NONE - DENIES PAIN

## 2022-05-02 NOTE — ED NOTES
Patient discharged to home via family. Written discharge instructions reviewed with understanding. Copy of AVS and signed prescription sent home with patient. Patient able to walk from ED without assistance.        Gelacio Ortez RN  05/02/22 0602

## 2022-05-02 NOTE — ED PROVIDER NOTES
[id number]          ATTENDING PHYSICIAN NOTE       Date of evaluation: 5/2/2022    Chief Complaint     Tingling (Pt reports facial itching and tingling from \"allergic reaction\" took Benadryl PO at home and felt better this AM) and Pruritis      History of Present Illness     Noelle Howe is a 61 y.o. female with a PMH as described below who presents to the ED today with a chief complaint of: Possible allergic reaction. The patient states that last night around 2 AM she woke up to go to the restroom and felt like her face was puffy and swollen. She felt like her lips and around her eyes were swollen. She took a Benadryl and by the time she woke up her symptoms had improved however she still complains of some tingling in her lips. She states that this has happened before. She has never seen allergy/immunology. She denies any difficulty swallowing, breathing, skin changes, abdominal pain, nausea, vomiting or diarrhea. She has only taken 1 Benadryl today. No history of ACE inhibitor usage. The patientstates that there were no other associated signs and symptoms or modifying factors. Patient rates pain as 0/10. Review of Systems     As described above in the HPI otherwise all the systems reviewed and negative as reported by the patient. Past Medical, Surgical, Family, and Social History     She has a past medical history of Anemia, Arthritis of knee, CVA (cerebral vascular accident) ([address] Heart disease, Hypertension, and Tobacco dependence. She has a past surgical history that includes ventral hernia repair; Tubal ligation; Tonsillectomy; joint replacement (Right); and pr corrj hallux valgus w/sesmdc w/2 osteot (Left, 10/26/2018). Her family history includes Cancer in her mother; Diabetes in her father; Heart Disease in her father; High Blood Pressure in her brother and father; Stroke in her paternal grandmother.   She reports that she quit smoking about 9 months ago. Her smoking use included cigarettes. She has a 5.50 pack-year smoking history. She has never used smokeless tobacco. She reports current alcohol use. She reports that she does not use drugs. Medications     Previous Medications    ALBUTEROL SULFATE  (90 BASE) MCG/ACT INHALER    INHALE 2 PUFF BY INHALATION ROUTE EVERY 4 - 6 HOURS AS NEEDED AS NEEDED FOR SHORTNESS OF BREATH    ASPIRIN 325 MG EC TABLET    Take 1 tablet by mouth daily    CHOLECALCIFEROL (VITAMIN D3) 2000 UNITS CAPS    Take 1,000 Units by mouth daily     FUROSEMIDE (LASIX) 20 MG TABLET    Take 1 tablet by mouth 2 times daily    IPRATROPIUM (ATROVENT) 0.06 % NASAL SPRAY        LOSARTAN-HYDROCHLOROTHIAZIDE (HYZAAR) 50-12.5 MG PER TABLET    Take 1 tablet by mouth daily    MULTIPLE VITAMIN (MULTIVITAMIN) TABS    Take 1 tablet by mouth daily    NYSTATIN (MYCOSTATIN) 525980 UNIT/ML SUSPENSION    TAKE 5 MILLILITER BY ORAL ROUTE 4 TIMES EVERY DAY    OMEGA-3 FATTY ACIDS (FISH OIL) 1000 MG CAPS    Take 1 capsule by mouth daily    SIMVASTATIN (ZOCOR) 10 MG TABLET    Take 1 tablet by mouth daily    TRUE METRIX BLOOD GLUCOSE TEST STRIP    INSERT BY IN GLUCOMETER ROUTE EVERY DAY AS DIRECTED FOR DIABETES TESTING       Allergies     She is allergic to codeine, celebrex [celecoxib], clopidogrel, and gabapentin. Physical Exam     INITIAL VITALS: BP: (!) 121/96, Temp: 98 °F (36.7 °C), Pulse: 55, Resp: 18, SpO2: 100 %   Physical Exam  Vitals and nursing note reviewed. Constitutional:       Appearance: Normal appearance. She is normal weight. HENT:      Head: Normocephalic and atraumatic. Nose: Nose normal.      Mouth/Throat:      Mouth: Mucous membranes are moist.      Comments: No appreciable tongue or lip swelling. No significant facial swelling. No rashes noted. Eyes:      Extraocular Movements: Extraocular movements intact. Pupils: Pupils are equal, round, and reactive to light.    Cardiovascular:      Rate and Rhythm: Normal rate and regular rhythm. Pulses: Normal pulses. Heart sounds: Normal heart sounds. Pulmonary:      Effort: Pulmonary effort is normal.      Breath sounds: Normal breath sounds. No wheezing. Abdominal:      General: There is no distension. Tenderness: There is no abdominal tenderness. Musculoskeletal:         General: Normal range of motion. Cervical back: Normal range of motion and neck supple. Right lower leg: No edema. Left lower leg: No edema. Skin:     General: Skin is warm. Capillary Refill: Capillary refill takes less than 2 seconds. Neurological:      General: No focal deficit present. Mental Status: She is alert and oriented to person, place, and time. Mental status is at baseline. DiagnosticResults     EKG       RADIOLOGY:  No orders to display       LABS:   No results found for this visit on 05/02/22. ED BEDSIDE ULTRASOUND:      RECENT VITALS:  BP: (!) 121/96, Temp: 98 °F (36.7 °C),Pulse: 55, Resp: 18, SpO2: 100 %     Procedures         ED Course     Nursing Notes, Past Medical Hx, Past Surgical Hx, Social Hx, Allergies, and Family Hx werereviewed. The patient was given thefollowing medications:  Orders Placed This Encounter   Medications    predniSONE (DELTASONE) tablet 20 mg    diphenhydrAMINE (BENADRYL) tablet 50 mg    cetirizine (ZYRTEC) 10 MG tablet     Sig: Take 1 tablet by mouth daily     Dispense:  30 tablet     Refill:  0    methylPREDNISolone (MEDROL, RENETTA,) 4 MG tablet     Sig: Take by mouth. Dispense:  1 kit     Refill:  0    EPINEPHrine (EPIPEN 2-RENETTA) 0.3 MG/0.3ML SOAJ injection     Sig: Inject 0.3 mLs into the muscle once for 1 dose Use as directed for allergic reaction     Dispense:  0.3 mL     Refill:  0       CONSULTS:  None    MEDICAL DECISION MAKING / ASSESSMENT / Yumiko Brenna is a 61 y.o. female who presents after a possible allergic reaction.   Patient states that she had facial swelling yesterday. I do not appreciate any significant swelling. No tongue swelling, lip swelling, difficulty breathing, stridor, or any significant symptoms suggestive of anaphylaxis. No vomiting or abdominal pain. No known trigger although she states that this has happened before. Given her symptoms, I believe it reasonable to start her on a short course of prednisone as well as Zyrtec. She was given a prescription for an EpiPen if this was to happen with a severe reaction and have given her contact information for allergy/immunology. She was discharged home in good condition. .      Clinical Impression     1. Allergic reaction, initial encounter        Disposition     PATIENT REFERRED TO:  Laly Mckinney, APRN - CNP  [address] [facility]  [phone]          Allergy and Immunology  [facility] [id number] [phone]          DISCHARGE MEDICATIONS:  New Prescriptions    CETIRIZINE (ZYRTEC) 10 MG TABLET    Take 1 tablet by mouth daily    EPINEPHRINE (EPIPEN 2-RENETTA) 0.3 MG/0.3ML SOAJ INJECTION    Inject 0.3 mLs into the muscle once for 1 dose Use as directed for allergic reaction    METHYLPREDNISOLONE (MEDROL, RENETTA,) 4 MG TABLET    Take by mouth.        DISPOSITION Discharge - Pending Orders Complete 05/02/2022 06:20:05 PM         Elias Lowery MD  05/02/22 [id number]

## 2022-05-11 ENCOUNTER — OFFICE VISIT (OUTPATIENT)
Dept: PRIMARY CARE CLINIC | Age: 67
End: 2022-05-11
Payer: MEDICAID

## 2022-05-11 VITALS
OXYGEN SATURATION: 98 % | RESPIRATION RATE: 13 BRPM | HEIGHT: 66 IN | TEMPERATURE: 97.9 F | BODY MASS INDEX: 38.89 KG/M2 | DIASTOLIC BLOOD PRESSURE: 68 MMHG | WEIGHT: 242 LBS | SYSTOLIC BLOOD PRESSURE: 113 MMHG | HEART RATE: 79 BPM

## 2022-05-11 DIAGNOSIS — J45.20 MILD INTERMITTENT ASTHMA, UNSPECIFIED WHETHER COMPLICATED: ICD-10-CM

## 2022-05-11 DIAGNOSIS — E78.5 TYPE 2 DIABETES MELLITUS WITH HYPERLIPIDEMIA (HCC): Primary | ICD-10-CM

## 2022-05-11 DIAGNOSIS — E11.69 TYPE 2 DIABETES MELLITUS WITH HYPERLIPIDEMIA (HCC): Primary | ICD-10-CM

## 2022-05-11 DIAGNOSIS — T78.40XD ALLERGIC REACTION, SUBSEQUENT ENCOUNTER: ICD-10-CM

## 2022-05-11 PROBLEM — T78.40XA ALLERGIC REACTION: Status: ACTIVE | Noted: 2022-05-11

## 2022-05-11 LAB
CREATININE URINE POCT: <15
HBA1C MFR BLD: 5.5 %
MICROALBUMIN/CREAT 24H UR: <5 MG/G{CREAT}
MICROALBUMIN/CREAT UR-RTO: NORMAL

## 2022-05-11 PROCEDURE — G8417 CALC BMI ABV UP PARAM F/U: HCPCS | Performed by: NURSE PRACTITIONER

## 2022-05-11 PROCEDURE — 3017F COLORECTAL CA SCREEN DOC REV: CPT | Performed by: NURSE PRACTITIONER

## 2022-05-11 PROCEDURE — 1036F TOBACCO NON-USER: CPT | Performed by: NURSE PRACTITIONER

## 2022-05-11 PROCEDURE — 99213 OFFICE O/P EST LOW 20 MIN: CPT | Performed by: NURSE PRACTITIONER

## 2022-05-11 PROCEDURE — 2022F DILAT RTA XM EVC RTNOPTHY: CPT | Performed by: NURSE PRACTITIONER

## 2022-05-11 PROCEDURE — 83036 HEMOGLOBIN GLYCOSYLATED A1C: CPT | Performed by: NURSE PRACTITIONER

## 2022-05-11 PROCEDURE — 82044 UR ALBUMIN SEMIQUANTITATIVE: CPT | Performed by: NURSE PRACTITIONER

## 2022-05-11 PROCEDURE — 3044F HG A1C LEVEL LT 7.0%: CPT | Performed by: NURSE PRACTITIONER

## 2022-05-11 PROCEDURE — G8427 DOCREV CUR MEDS BY ELIG CLIN: HCPCS | Performed by: NURSE PRACTITIONER

## 2022-05-11 ASSESSMENT — ENCOUNTER SYMPTOMS
CHEST TIGHTNESS: 0
SHORTNESS OF BREATH: 0
DIARRHEA: 0
CONSTIPATION: 0
ABDOMINAL PAIN: 0

## 2022-05-11 NOTE — PROGRESS NOTES
eFrny Shah (:  1955) is a 61 y.o. black female,Established patient, here for evaluation of the following chief complaint(s):  Diabetes         ASSESSMENT/PLAN:  1. Type 2 diabetes mellitus with hyperlipidemia (HCC)at goal for diabetes and cholesterol   -     POCT glycosylated hemoglobin (Hb A1C):5.5%  -     POCT microalbumin  -Maintain life style changes  2. Mild intermittent asthma, unspecified whether complicated  -Stable  3. Allergic reaction, subsequent encounter  -     External Referral To Allergy  -Keep epi-pen on person      Return in about 6 months (around 2022). Ms. Zachary Rios states she has eye exam, PAP, and mammogram scheduled. Subjective   SUBJECTIVE/OBJECTIVE:  HPI    Had angioedema 4-6 months ago after eating crab salad. Lip and facial swelling with itching. Evaluated at Urgent care with resolution after treatment. Recently evaluated in emergency department on  for similar symptoms. Unsure of cause, concerned it may be medication. She has discontinued ASA, completing medrol pack and taking Cetirizine. Symptoms have completely resolved, requesting referral to an Allergist.  Has epi-pen, instructed on use    Hypertension  This is a chronic problem. The current episode started more than 1 year ago. The problem is controlled. Pertinent negatives include no chest pain, headaches, palpitations or shortness of breath. There are no associated agents to hypertension. Risk factors for coronary artery disease include dyslipidemia, diabetes mellitus and obesity. Past treatments include diuretics, angiotensin blockers and lifestyle changes. The current treatment provides significant improvement. Compliance problems include diet and exercise.    Hyperlipidemia  This is a chronic problem. Factors aggravating her hyperlipidemia include smoking and thiazides. Pertinent negatives include no chest pain, myalgias or shortness of breath.  Current antihyperlipidemic treatment includes diet change, exercise and statins. The current treatment provides moderate improvement of lipids. Compliance problems include adherence to diet and adherence to exercise.  Risk factors for coronary artery disease include hypertension, obesity, post-menopausal, dyslipidemia and diabetes mellitus. States she forgets to take the medication. [place] 6.1% 11/1/2021  She presents for her follow-up diabetic visit. She has type 2 diabetes mellitus. There are no hypoglycemic associated symptoms. Pertinent negatives for hypoglycemia include no dizziness, headaches or nervousness/anxiousness. Pertinent negatives for diabetes include no chest pain and no polyuria. There are no hypoglycemic complications. Symptoms are stable. There are no diabetic complications. Risk factors for coronary artery disease include diabetes mellitus, dyslipidemia, family history, obesity, post-menopausal and tobacco exposure. Current diabetic treatment includes diet. She is compliant with treatment some of the time. She is following a generally unhealthy diet. An ACE inhibitor/angiotensin II receptor blocker is being taken. brought  Blood sugar readings to appointment: .      History of TIA's  Has had multiple TIA's, most recent in 10/2020. 10 day monitor revealed  SR with unifocal PVC's but no atrial arrhythmias. Followed by Cardiac electrophysiology. Patient is current everyday smoker.     Shortness of breath  States the albuterol inhaler she has is not effective. She demonstrated use of inhaler. States it goes onto her tongue.  Wants inhaler device with a larger opening.    Obesity  General weight loss/lifestyle modification strategies discussed (elicit support from others; identify saboteurs; non-food rewards, etc).     Goals:   -Eat 4-5 times daily  -Avoid high fat and high sugar foods  -Include protein with all meals and snacks  -Avoid carbonation and caffeine  -Avoid calorie containing beverages  -Increase physical activity as tolerated     Overweight/Obesity related to lack of exercise, sedentary lifestyle, unhealthy eating habits, and unsuccessful diet attempts as evidenced by BMI    Review of Systems   Constitutional: Negative for activity change and fever. HENT: Negative for congestion. Tonsillectomy   Eyes: Negative for visual disturbance. Respiratory: Negative for chest tightness and shortness of breath. Cardiovascular: Negative for chest pain, palpitations and leg swelling. Hypertension, hyperlipidemia    12/12/2019 Echo:   Normal left ventricle size, wall thickness, and systolic function with an   estimated ejection fraction of 55-60%. Gastrointestinal: Negative for abdominal pain, constipation and diarrhea. Endocrine: Negative for polyuria. Genitourinary: Negative for dysuria. Tubal Ligation   Musculoskeletal: Negative for arthralgias and myalgias. Left rotator cuff tendinitis  TKA right knee   Skin: Negative for rash. Neurological: Negative for dizziness, light-headedness and headaches. History of multiple TIA's and CVA   Psychiatric/Behavioral: Negative for agitation, decreased concentration and sleep disturbance. The patient is not nervous/anxious. Objective     height is 5' 6\" (1.676 m) and weight is 242 lb (109.8 kg). Her temporal temperature is 97.9 °F (36.6 °C). Her blood pressure is 113/68 and her pulse is 79. Her respiration is 13 and oxygen saturation is 98%.      BP Readings from Last 3 Encounters:   05/11/22 113/68   05/02/22 (!) 121/96   01/18/22 107/68     Wt Readings from Last 3 Encounters:   05/11/22 242 lb (109.8 kg)   05/02/22 245 lb (111.1 kg)   01/18/22 245 lb (111.1 kg)     Lab Results   Component Value Date    WBC 5.1 01/04/2022    HGB 13.2 01/04/2022    HCT 39.3 01/04/2022    MCV 87.5 01/04/2022     01/04/2022     Lab Results   Component Value Date     01/04/2022    K 3.8 01/04/2022     01/04/2022    CO2 25 01/04/2022    BUN 13 01/04/2022    CREATININE 0.8 01/04/2022    GLUCOSE 88 01/04/2022    CALCIUM 9.4 01/04/2022    PROT 6.5 11/01/2021    LABALBU 4.1 11/01/2021    BILITOT 0.4 11/01/2021    ALKPHOS 69 11/01/2021    AST 16 11/01/2021    ALT 12 11/01/2021    LABGLOM >60 01/04/2022    GFRAA >60 01/04/2022    AGRATIO 1.7 11/01/2021    GLOB 2.9 12/11/2019       Physical Exam  Vitals reviewed. Constitutional:       Appearance: She is well-developed. HENT:      Head: Normocephalic. Right Ear: Hearing and external ear normal.      Left Ear: Hearing and external ear normal.      Nose: Nose normal.   Eyes:      General: Lids are normal.   Cardiovascular:      Rate and Rhythm: Normal rate and regular rhythm. Heart sounds: Normal heart sounds, S1 normal and S2 normal.   Pulmonary:      Effort: Pulmonary effort is normal.      Breath sounds: Normal breath sounds. Musculoskeletal:         General: Normal range of motion. Skin:     General: Skin is warm and dry. Findings: No rash. Neurological:      Mental Status: She is alert and oriented to person, place, and time. GCS: GCS eye subscore is 4. GCS verbal subscore is 5. GCS motor subscore is 6. Psychiatric:         Speech: Speech normal.         Behavior: Behavior normal. Behavior is cooperative. On this date 5/11/2022 I have spent 15 minutes reviewing previous notes, test results and face to face with the patient discussing the diagnosis and importance of compliance with the treatment plan as well as documenting on the day of the visit. An electronic signature was used to authenticate this note.     --LYNN Wolf - CNP

## 2022-05-17 ENCOUNTER — TELEPHONE (OUTPATIENT)
Dept: PRIMARY CARE CLINIC | Age: 67
End: 2022-05-17

## 2022-05-17 RX ORDER — CETIRIZINE HYDROCHLORIDE 10 MG/1
10 TABLET ORAL DAILY
Qty: 90 TABLET | Refills: 3 | Status: SHIPPED | OUTPATIENT
Start: 2022-05-17 | End: 2022-06-16

## 2022-05-17 NOTE — TELEPHONE ENCOUNTER
cetirizine (ZYRTEC) 10 MG tablet [[id number]]     Order Details  Dose: 10 mg     Pt called in inquiring about refills on her allergy pills.   Please call

## 2022-06-27 RX ORDER — FUROSEMIDE 20 MG/1
20 TABLET ORAL 2 TIMES DAILY
Qty: 60 TABLET | Refills: 3 | Status: SHIPPED | OUTPATIENT
Start: 2022-06-27

## 2022-06-27 NOTE — TELEPHONE ENCOUNTER
Medication:   Requested Prescriptions     Pending Prescriptions Disp Refills    nystatin (MYCOSTATIN) 064034 UNIT/ML suspension [Pharmacy Med Name: NYSTATIN 100,000 UNIT/ML LEMA [id number] JAMIE] 200 mL 5     Sig: TAKE 5 MILLILITER BY ORAL ROUTE 4 TIMES EVERY DAY    furosemide (LASIX) 20 MG tablet [Pharmacy Med Name: FUROSEMIDE 20 MG TAB 20 Tablet] 60 tablet 3     Sig: TAKE 1 TABLET BY MOUTH 2 TIMES DAILY        Last Filled:      Patient Phone Number: [phone] (home)     Last appt: 5/11/2022   Next appt: Visit date not found    Last OARRS: No flowsheet data found.

## 2022-07-28 RX ORDER — CHOLECALCIFEROL (VITAMIN D3) 25 MCG
CAPSULE ORAL
Qty: 30 CAPSULE | Refills: 11 | Status: SHIPPED | OUTPATIENT
Start: 2022-07-28

## 2022-07-31 NOTE — PROGRESS NOTES
Greg Prieto (:  1955) is a 61 y.o. female,Established patient, here for evaluation of the following chief complaint(s):  Follow-up (After visit from allergy clinic )         ASSESSMENT/PLAN:  1. Obesity, diabetes, and hypertension syndrome ([address]  -Discontinue Hyzaar  -Start Amlodipine daily  -Have Epi-pen on person  2. Allergic Reaction- reoccurring episodes of lip swelling; previously prescribed Hyzaar   -Discontinue Hyzaar  -Start Amlodipine daily  -Have Epi-pen on person  Return in about 4 weeks (around 2022). Subjective   SUBJECTIVE/OBJECTIVE:  HPI  Had angioedema 4-6 months ago after eating crab salad. Lip and facial swelling with itching. Evaluated at Urgent care with resolution after treatment. Recently evaluated in emergency department on  for similar symptoms. Unsure of cause, concerned it may be medication. Discontinued ASA, completed medrol pack, taking Cetirizine. Symptoms have completely resolved. Has epi-pen, instructed on use  Last night after eating buffalo wings, and salad around 10 pm, felt tingling and swelling of lips, eyes started itching. took Benadryl at 4am, symptoms resolved. Every few days has these symptoms occur, still taking BP medication every morning. Evaluated by Allergist , started on eye drops. Hypertension  This is a chronic problem. The current episode started more than 1 year ago. The problem is controlled. Pertinent negatives include no chest pain, headaches, palpitations or shortness of breath. There are no associated agents to hypertension. Risk factors for coronary artery disease include dyslipidemia, diabetes mellitus and obesity. Past treatments include diuretics, angiotensin blockers and lifestyle changes. The current treatment provides significant improvement. Compliance problems include diet and exercise. Hyperlipidemia  This is a chronic problem. Factors aggravating her hyperlipidemia include smoking and thiazides.  Pertinent negatives include no chest pain, myalgias or shortness of breath. Current antihyperlipidemic treatment includes diet change, exercise and statins. The current treatment provides moderate improvement of lipids. Compliance problems include adherence to diet and adherence to exercise. Risk factors for coronary artery disease include hypertension, obesity, post-menopausal, dyslipidemia and diabetes mellitus. States she forgets to take the medication. Diabetes A1C 6.1% 11/1/2021  She presents for her follow-up diabetic visit. She has type 2 diabetes mellitus. There are no hypoglycemic associated symptoms. Pertinent negatives for hypoglycemia include no dizziness, headaches or nervousness/anxiousness. Pertinent negatives for diabetes include no chest pain and no polyuria. There are no hypoglycemic complications. Symptoms are stable. There are no diabetic complications. Risk factors for coronary artery disease include diabetes mellitus, dyslipidemia, family history, obesity, post-menopausal and tobacco exposure. Current diabetic treatment includes diet. She is compliant with treatment some of the time. She is following a generally unhealthy diet. An ACE inhibitor/angiotensin II receptor blocker is being taken. brought  Blood sugar readings to appointment: . History of TIA's  Has had multiple TIA's, most recent in 10/2020. 10 day monitor revealed  SR with unifocal PVC's but no atrial arrhythmias. Followed by Cardiac electrophysiology. Patient is current everyday smoker. Shortness of breath  States the albuterol inhaler she has is not effective. She demonstrated use of inhaler. States it goes onto her tongue. Wants inhaler device with a larger opening. Obesity  General weight loss/lifestyle modification strategies discussed (elicit support from others; identify saboteurs; non-food rewards, etc).      Goals:  -Eat 4-5 times daily  -Avoid high fat and high sugar foods  -Include protein with all meals and snacks  -Avoid carbonation and caffeine  -Avoid calorie containing beverages  -Increase physical activity as tolerated     Overweight/Obesity related to lack of exercise, sedentary lifestyle, unhealthy eating habits, and unsuccessful diet attempts as evidenced by BMI       Review of Systems       Objective    height is 5' 6\" (1.676 m) and weight is 239 lb 9.6 oz (108.7 kg). Her temperature is 97.2 °F (36.2 °C). Her blood pressure is 110/74 and her pulse is 67. Her oxygen saturation is 99%. BP Readings from Last 3 Encounters:   08/01/22 110/74   05/11/22 113/68   05/02/22 (!) 121/96      Wt Readings from Last 3 Encounters:   08/01/22 239 lb 9.6 oz (108.7 kg)   05/11/22 242 lb (109.8 kg)   05/02/22 245 lb (111.1 kg)      Physical Exam             An electronic signature was used to authenticate this note.     --Chinedu Mirza, LYNN - CNP

## 2022-08-01 ENCOUNTER — OFFICE VISIT (OUTPATIENT)
Dept: PRIMARY CARE CLINIC | Age: 67
End: 2022-08-01
Payer: MEDICAID

## 2022-08-01 VITALS
TEMPERATURE: 97.2 F | DIASTOLIC BLOOD PRESSURE: 74 MMHG | WEIGHT: 239.6 LBS | OXYGEN SATURATION: 99 % | SYSTOLIC BLOOD PRESSURE: 110 MMHG | HEIGHT: 66 IN | BODY MASS INDEX: 38.51 KG/M2 | HEART RATE: 67 BPM

## 2022-08-01 DIAGNOSIS — T78.40XD ALLERGIC REACTION, SUBSEQUENT ENCOUNTER: ICD-10-CM

## 2022-08-01 DIAGNOSIS — E66.9 OBESITY, DIABETES, AND HYPERTENSION SYNDROME (HCC): Primary | ICD-10-CM

## 2022-08-01 DIAGNOSIS — E11.69 OBESITY, DIABETES, AND HYPERTENSION SYNDROME (HCC): Primary | ICD-10-CM

## 2022-08-01 DIAGNOSIS — I15.2 OBESITY, DIABETES, AND HYPERTENSION SYNDROME (HCC): Primary | ICD-10-CM

## 2022-08-01 DIAGNOSIS — E11.59 OBESITY, DIABETES, AND HYPERTENSION SYNDROME (HCC): Primary | ICD-10-CM

## 2022-08-01 PROCEDURE — 1036F TOBACCO NON-USER: CPT | Performed by: NURSE PRACTITIONER

## 2022-08-01 PROCEDURE — G8427 DOCREV CUR MEDS BY ELIG CLIN: HCPCS | Performed by: NURSE PRACTITIONER

## 2022-08-01 PROCEDURE — 99214 OFFICE O/P EST MOD 30 MIN: CPT | Performed by: NURSE PRACTITIONER

## 2022-08-01 PROCEDURE — 3044F HG A1C LEVEL LT 7.0%: CPT | Performed by: NURSE PRACTITIONER

## 2022-08-01 PROCEDURE — 3017F COLORECTAL CA SCREEN DOC REV: CPT | Performed by: NURSE PRACTITIONER

## 2022-08-01 PROCEDURE — 2022F DILAT RTA XM EVC RTNOPTHY: CPT | Performed by: NURSE PRACTITIONER

## 2022-08-01 PROCEDURE — G8417 CALC BMI ABV UP PARAM F/U: HCPCS | Performed by: NURSE PRACTITIONER

## 2022-08-01 RX ORDER — AMLODIPINE BESYLATE 5 MG/1
5 TABLET ORAL DAILY
Qty: 90 TABLET | Refills: 1 | Status: SHIPPED | OUTPATIENT
Start: 2022-08-01

## 2022-08-01 RX ORDER — KETOTIFEN FUMARATE 0.35 MG/ML
SOLUTION/ DROPS OPHTHALMIC
COMMUNITY
Start: 2022-07-22

## 2022-08-01 ASSESSMENT — PATIENT HEALTH QUESTIONNAIRE - PHQ9
SUM OF ALL RESPONSES TO PHQ QUESTIONS 1-9: 0
SUM OF ALL RESPONSES TO PHQ QUESTIONS 1-9: 0
2. FEELING DOWN, DEPRESSED OR HOPELESS: 0
SUM OF ALL RESPONSES TO PHQ QUESTIONS 1-9: 0
SUM OF ALL RESPONSES TO PHQ QUESTIONS 1-9: 0
SUM OF ALL RESPONSES TO PHQ9 QUESTIONS 1 & 2: 0
1. LITTLE INTEREST OR PLEASURE IN DOING THINGS: 0

## 2022-08-22 NOTE — PROGRESS NOTES
Saritha Gaspar (:  1955) is a 61 y.o. female,Established patient, here for evaluation of the following chief complaint(s):  Follow-up and Medication Check         ASSESSMENT/PLAN:  1. Obesity, diabetes, and hypertension syndrome ([facility] [address]  -continue Amlodipine  -continue lasix  -The patient is advised to quit smoking, follow a low fat, low cholesterol diet, decrease or avoid alcohol intake, reduce exposure to stress, improve dietary compliance, and continue current medications. 2. Encounter for screening mammogram for malignant neoplasm of breast  -     [facility] DIGITAL SCREEN W OR WO CAD BILATERAL; Future  3. Allergic reaction, subsequent encounter- no recent allergic reactions  -Have Epi-pen on person  Return in about 6 months (around 2023). Subjective   SUBJECTIVE/OBJECTIVE:  HPI  Had angioedema 4-6 months ago after eating crab salad. Lip and facial swelling with itching. Evaluated at Urgent care with resolution after treatment. Recently evaluated in emergency department on  for similar symptoms. Unsure of cause, concerned it may be medication. Discontinued ASA, completed medrol pack, taking Cetirizine. Symptoms have completely resolved. Has epi-pen, instructed on use  Last night after eating buffalo wings, and salad around 10 pm, felt tingling and swelling of lips, eyes started itching. took Benadryl at 4am, symptoms resolved. Every few days has these symptoms occur, still taking BP medication every morning. Evaluated by Allergist , started on eye drops. Hypertension  This is a chronic problem. The current episode started more than 1 year ago. The problem is controlled. Pertinent negatives include no chest pain, headaches, palpitations or shortness of breath. There are no associated agents to hypertension. Risk factors for coronary artery disease include dyslipidemia, diabetes mellitus and obesity. Past treatments include diuretics, angiotensin blockers and lifestyle changes. The current treatment provides significant improvement. Compliance problems include diet and exercise. Hyperlipidemia  This is a chronic problem. Factors aggravating her hyperlipidemia include smoking and thiazides. Pertinent negatives include no chest pain, myalgias or shortness of breath. Current antihyperlipidemic treatment includes diet change, exercise and statins. The current treatment provides moderate improvement of lipids. Compliance problems include adherence to diet and adherence to exercise. Risk factors for coronary artery disease include hypertension, obesity, post-menopausal, dyslipidemia and diabetes mellitus. States she forgets to take the medication. Diabetes A1C 6.1% 11/1/2021  She presents for her follow-up diabetic visit. She has type 2 diabetes mellitus. There are no hypoglycemic associated symptoms. Pertinent negatives for hypoglycemia include no dizziness, headaches or nervousness/anxiousness. Pertinent negatives for diabetes include no chest pain and no polyuria. There are no hypoglycemic complications. Symptoms are stable. There are no diabetic complications. Risk factors for coronary artery disease include diabetes mellitus, dyslipidemia, family history, obesity, post-menopausal and tobacco exposure. Current diabetic treatment includes diet. She is compliant with treatment some of the time. She is following a generally unhealthy diet. An ACE inhibitor/angiotensin II receptor blocker is being taken. brought  Blood sugar readings to appointment: . History of TIA's  Has had multiple TIA's, most recent in 10/2020. 10 day monitor revealed  SR with unifocal PVC's but no atrial arrhythmias. Followed by Cardiac electrophysiology. Patient is current everyday smoker. Shortness of breath  States the albuterol inhaler she has is not effective. She demonstrated use of inhaler. States it goes onto her tongue. Wants inhaler device with a larger opening.     Obesity  General weight loss/lifestyle modification strategies discussed (elicit support from others; identify saboteurs; non-food rewards, etc). Goals:  -Eat 4-5 times daily  -Avoid high fat and high sugar foods  -Include protein with all meals and snacks  -Avoid carbonation and caffeine  -Avoid calorie containing beverages  -Increase physical activity as tolerated     Overweight/Obesity related to lack of exercise, sedentary lifestyle, unhealthy eating habits, and unsuccessful diet attempts as evidenced by BMI        Review of Systems   Constitutional:  Negative for activity change and fever. HENT:  Negative for congestion. Tonsillectomy   Eyes:  Negative for visual disturbance. Respiratory:  Negative for chest tightness and shortness of breath. Cardiovascular:  Negative for chest pain, palpitations and leg swelling. Hypertension, hyperlipidemia    12/12/2019 Echo:   Normal left ventricle size, wall thickness, and systolic function with an   estimated ejection fraction of 55-60%. Gastrointestinal:  Negative for abdominal pain, constipation and diarrhea. Endocrine: Negative for polyuria. Genitourinary:  Negative for dysuria. Tubal Ligation   Musculoskeletal:  Negative for arthralgias and myalgias. Left rotator cuff tendinitis  TKA right knee   Skin:  Negative for rash. Neurological:  Negative for dizziness, light-headedness and headaches. History of multiple TIA's and CVA   Psychiatric/Behavioral:  Negative for agitation, decreased concentration and sleep disturbance. The patient is not nervous/anxious. Objective    height is 5' 6\" (1.676 m) and weight is 242 lb (109.8 kg). Her temperature is 97.1 °F (36.2 °C). Her blood pressure is 109/68 and her pulse is 68. Her oxygen saturation is 98%.     BP Readings from Last 3 Encounters:   08/29/22 109/68   08/01/22 110/74   05/11/22 113/68     Wt Readings from Last 3 Encounters:   08/29/22 242 lb (109.8 kg)   08/01/22 239 lb 9.6 oz (108.7 kg)   22 242 lb (109.8 kg)   . Past Medical History:   Diagnosis Date    Anemia     Arthritis of knee     CVA (cerebral vascular accident) ([facility] [address]     left side weakness    Heart disease     ? TYPE    Hypertension     Rheumatoid arthritis ([facility] [address] 2013    Formatting of this note might be different from the original. ICD-10 Transition    Tobacco dependence       Past Surgical History:   Procedure Laterality Date    JOINT REPLACEMENT Right     Right knee    [place] CORRJ HALLUX VALGUS W/SESMDC W/2 OSTEOT Left 10/26/2018    MODIFIED YEN BUNIONECTOMY WITH FIRST METATARSAL OSTEOTOMY WITH  ANNALISA OSTEOTOMY, LEFT FOOT performed by Shanta Flores DPM at [place] [address]        Family History   Problem Relation Age of Onset    Cancer Mother         LUNG    Heart Disease Father     High Blood Pressure Father     Diabetes Father     High Blood Pressure Brother     Stroke Paternal Grandmother       Social History     Tobacco Use    Smoking status: Former     Packs/day: 0.25     Years: 22.00     Pack years: 5.50     Types: Cigarettes     Quit date: 2021     Years since quittin.1    Smokeless tobacco: Never    Tobacco comments:     3-4 cigarettes per day   Vaping Use    Vaping Use: Never used   Substance Use Topics    Alcohol use: Yes     Comment: OCC BEER    Drug use: No      Outpatient Encounter Medications as of 2022   Medication Sig Dispense Refill    naproxen (NAPROSYN) 250 MG tablet Take 1 tablet by mouth 2 times daily as needed for Pain 180 tablet 1    simvastatin (ZOCOR) 10 MG tablet TAKE 1 TABLET BY MOUTH DAILY 30 tablet 3    ketotifen (ZADITOR) 0.025 % ophthalmic solution       amLODIPine (NORVASC) 5 MG tablet Take 1 tablet by mouth in the morning.  90 tablet 1    VITAMIN D HIGH POTENCY 25 MCG (1000 UT) CAPS TAKE ONE TABLET ORAL ROUTE DAILY FOR BONE HEALTH 30 capsule 11    nystatin (MYCOSTATIN) 554833 UNIT/ML suspension TAKE 5 MILLILITER BY ORAL ROUTE 4 TIMES EVERY  mL 5    furosemide (LASIX) 20 MG tablet TAKE 1 TABLET BY MOUTH 2 TIMES DAILY 60 tablet 3    TRUE METRIX BLOOD GLUCOSE TEST strip INSERT BY IN GLUCOMETER ROUTE EVERY DAY AS DIRECTED FOR DIABETES TESTING 50 strip 11    Multiple Vitamin (MULTIVITAMIN) TABS Take 1 tablet by mouth daily 90 tablet 1    Omega-3 Fatty Acids (FISH OIL) 1000 MG CAPS Take 1 capsule by mouth daily 90 capsule 3    albuterol sulfate  (90 Base) MCG/ACT inhaler INHALE 2 PUFF BY INHALATION ROUTE EVERY 4 - 6 HOURS AS NEEDED AS NEEDED FOR SHORTNESS OF BREATH 18 g 3    ipratropium (ATROVENT) 0.06 % nasal spray       EPINEPHrine (EPIPEN 2-RENETTA) 0.3 MG/0.3ML SOAJ injection Inject 0.3 mLs into the muscle once for 1 dose Use as directed for allergic reaction 0.3 mL 0    [DISCONTINUED] simvastatin (ZOCOR) 10 MG tablet Take 1 tablet by mouth daily 90 tablet 1     No facility-administered encounter medications on file as of 8/29/2022. Physical Exam  Vitals reviewed. Constitutional:       Appearance: She is well-developed. HENT:      Head: Normocephalic. Right Ear: Hearing and external ear normal.      Left Ear: Hearing and external ear normal.      Nose: Nose normal.   Eyes:      General: Lids are normal.   Cardiovascular:      Rate and Rhythm: Normal rate and regular rhythm. Heart sounds: Normal heart sounds, S1 normal and S2 normal.   Pulmonary:      Effort: Pulmonary effort is normal.      Breath sounds: Normal breath sounds. Musculoskeletal:         General: Normal range of motion. Skin:     General: Skin is warm and dry. Findings: No rash. Neurological:      Mental Status: She is alert and oriented to person, place, and time. GCS: GCS eye subscore is 4. GCS verbal subscore is 5. GCS motor subscore is 6. Psychiatric:         Speech: Speech normal.         Behavior: Behavior normal. Behavior is cooperative.           On this date 8/29/2022 I have spent 15 minutes reviewing previous notes, test results and face to face with the patient discussing the diagnosis and importance of compliance with the treatment plan as well as documenting on the day of the visit. An electronic signature was used to authenticate this note.     --LYNN Chow - CNP

## 2022-08-23 DIAGNOSIS — E11.69 TYPE 2 DIABETES MELLITUS WITH HYPERLIPIDEMIA (HCC): ICD-10-CM

## 2022-08-23 DIAGNOSIS — E78.5 TYPE 2 DIABETES MELLITUS WITH HYPERLIPIDEMIA (HCC): ICD-10-CM

## 2022-08-24 RX ORDER — SIMVASTATIN 10 MG
10 TABLET ORAL DAILY
Qty: 30 TABLET | Refills: 3 | Status: SHIPPED | OUTPATIENT
Start: 2022-08-24

## 2022-08-24 NOTE — TELEPHONE ENCOUNTER
Medication:   Requested Prescriptions     Pending Prescriptions Disp Refills    simvastatin (ZOCOR) 10 MG tablet [Pharmacy Med Name: SIMVASTATIN 10 MG TABS 10 Tablet] 30 tablet 3     Sig: TAKE 1 TABLET BY MOUTH DAILY        Last Filled:      Patient Phone Number: [phone] (home)     Last appt: 8/1/2022   Next appt: 8/29/2022    Last OARRS: No flowsheet data found.

## 2022-08-27 ASSESSMENT — ENCOUNTER SYMPTOMS
CHEST TIGHTNESS: 0
CONSTIPATION: 0
ABDOMINAL PAIN: 0
DIARRHEA: 0
SHORTNESS OF BREATH: 0

## 2022-08-29 ENCOUNTER — OFFICE VISIT (OUTPATIENT)
Dept: PRIMARY CARE CLINIC | Age: 67
End: 2022-08-29
Payer: MEDICAID

## 2022-08-29 VITALS
OXYGEN SATURATION: 98 % | BODY MASS INDEX: 38.89 KG/M2 | TEMPERATURE: 97.1 F | WEIGHT: 242 LBS | SYSTOLIC BLOOD PRESSURE: 109 MMHG | HEIGHT: 66 IN | HEART RATE: 68 BPM | DIASTOLIC BLOOD PRESSURE: 68 MMHG

## 2022-08-29 DIAGNOSIS — I15.2 OBESITY, DIABETES, AND HYPERTENSION SYNDROME (HCC): Primary | ICD-10-CM

## 2022-08-29 DIAGNOSIS — Z12.31 ENCOUNTER FOR SCREENING MAMMOGRAM FOR MALIGNANT NEOPLASM OF BREAST: ICD-10-CM

## 2022-08-29 DIAGNOSIS — E11.69 OBESITY, DIABETES, AND HYPERTENSION SYNDROME (HCC): Primary | ICD-10-CM

## 2022-08-29 DIAGNOSIS — T78.40XD ALLERGIC REACTION, SUBSEQUENT ENCOUNTER: ICD-10-CM

## 2022-08-29 DIAGNOSIS — E11.59 OBESITY, DIABETES, AND HYPERTENSION SYNDROME (HCC): Primary | ICD-10-CM

## 2022-08-29 DIAGNOSIS — E66.9 OBESITY, DIABETES, AND HYPERTENSION SYNDROME (HCC): Primary | ICD-10-CM

## 2022-08-29 PROCEDURE — G8417 CALC BMI ABV UP PARAM F/U: HCPCS | Performed by: NURSE PRACTITIONER

## 2022-08-29 PROCEDURE — 99214 OFFICE O/P EST MOD 30 MIN: CPT | Performed by: NURSE PRACTITIONER

## 2022-08-29 PROCEDURE — 2022F DILAT RTA XM EVC RTNOPTHY: CPT | Performed by: NURSE PRACTITIONER

## 2022-08-29 PROCEDURE — G8427 DOCREV CUR MEDS BY ELIG CLIN: HCPCS | Performed by: NURSE PRACTITIONER

## 2022-08-29 PROCEDURE — 1036F TOBACCO NON-USER: CPT | Performed by: NURSE PRACTITIONER

## 2022-08-29 PROCEDURE — 3017F COLORECTAL CA SCREEN DOC REV: CPT | Performed by: NURSE PRACTITIONER

## 2022-08-29 PROCEDURE — 3044F HG A1C LEVEL LT 7.0%: CPT | Performed by: NURSE PRACTITIONER

## 2022-08-29 RX ORDER — NAPROXEN 250 MG/1
250 TABLET ORAL 2 TIMES DAILY PRN
Qty: 180 TABLET | Refills: 1 | Status: SHIPPED | OUTPATIENT
Start: 2022-08-29

## 2022-08-29 SDOH — ECONOMIC STABILITY: FOOD INSECURITY: WITHIN THE PAST 12 MONTHS, YOU WORRIED THAT YOUR FOOD WOULD RUN OUT BEFORE YOU GOT MONEY TO BUY MORE.: OFTEN TRUE

## 2022-08-29 SDOH — ECONOMIC STABILITY: FOOD INSECURITY: WITHIN THE PAST 12 MONTHS, THE FOOD YOU BOUGHT JUST DIDN'T LAST AND YOU DIDN'T HAVE MONEY TO GET MORE.: SOMETIMES TRUE

## 2022-08-29 ASSESSMENT — PATIENT HEALTH QUESTIONNAIRE - PHQ9
SUM OF ALL RESPONSES TO PHQ QUESTIONS 1-9: 0
SUM OF ALL RESPONSES TO PHQ QUESTIONS 1-9: 0
2. FEELING DOWN, DEPRESSED OR HOPELESS: 0
SUM OF ALL RESPONSES TO PHQ QUESTIONS 1-9: 0
SUM OF ALL RESPONSES TO PHQ QUESTIONS 1-9: 0
1. LITTLE INTEREST OR PLEASURE IN DOING THINGS: 0
SUM OF ALL RESPONSES TO PHQ9 QUESTIONS 1 & 2: 0

## 2022-08-29 ASSESSMENT — SOCIAL DETERMINANTS OF HEALTH (SDOH): HOW HARD IS IT FOR YOU TO PAY FOR THE VERY BASICS LIKE FOOD, HOUSING, MEDICAL CARE, AND HEATING?: NOT HARD AT ALL

## 2022-09-27 RX ORDER — DIPHENOXYLATE HYDROCHLORIDE AND ATROPINE SULFATE 2.5; .025 MG/1; MG/1
TABLET ORAL
Qty: 30 TABLET | Refills: 3 | Status: SHIPPED | OUTPATIENT
Start: 2022-09-27

## 2022-10-14 ENCOUNTER — OFFICE VISIT (OUTPATIENT)
Dept: PRIMARY CARE CLINIC | Age: 67
End: 2022-10-14
Payer: COMMERCIAL

## 2022-10-14 VITALS
WEIGHT: 242.2 LBS | DIASTOLIC BLOOD PRESSURE: 72 MMHG | SYSTOLIC BLOOD PRESSURE: 117 MMHG | OXYGEN SATURATION: 99 % | BODY MASS INDEX: 39.09 KG/M2 | TEMPERATURE: 97.8 F | HEART RATE: 72 BPM

## 2022-10-14 DIAGNOSIS — Z76.89 ENCOUNTER TO ESTABLISH CARE: Primary | ICD-10-CM

## 2022-10-14 DIAGNOSIS — I10 ESSENTIAL HYPERTENSION: ICD-10-CM

## 2022-10-14 DIAGNOSIS — Z86.73 HISTORY OF TRANSIENT ISCHEMIC ATTACK (TIA): ICD-10-CM

## 2022-10-14 DIAGNOSIS — J31.0 CHRONIC RHINITIS: ICD-10-CM

## 2022-10-14 DIAGNOSIS — J41.0 SIMPLE CHRONIC BRONCHITIS (HCC): ICD-10-CM

## 2022-10-14 DIAGNOSIS — R73.01 IMPAIRED FASTING GLUCOSE: ICD-10-CM

## 2022-10-14 DIAGNOSIS — Z23 NEEDS FLU SHOT: ICD-10-CM

## 2022-10-14 DIAGNOSIS — E66.9 OBESITY (BMI 30-39.9): ICD-10-CM

## 2022-10-14 PROCEDURE — 3078F DIAST BP <80 MM HG: CPT | Performed by: FAMILY MEDICINE

## 2022-10-14 PROCEDURE — 3074F SYST BP LT 130 MM HG: CPT | Performed by: FAMILY MEDICINE

## 2022-10-14 PROCEDURE — 1036F TOBACCO NON-USER: CPT | Performed by: FAMILY MEDICINE

## 2022-10-14 PROCEDURE — G8482 FLU IMMUNIZE ORDER/ADMIN: HCPCS | Performed by: FAMILY MEDICINE

## 2022-10-14 PROCEDURE — G8427 DOCREV CUR MEDS BY ELIG CLIN: HCPCS | Performed by: FAMILY MEDICINE

## 2022-10-14 PROCEDURE — 90674 CCIIV4 VAC NO PRSV 0.5 ML IM: CPT | Performed by: FAMILY MEDICINE

## 2022-10-14 PROCEDURE — 99204 OFFICE O/P NEW MOD 45 MIN: CPT | Performed by: FAMILY MEDICINE

## 2022-10-14 PROCEDURE — 90471 IMMUNIZATION ADMIN: CPT | Performed by: FAMILY MEDICINE

## 2022-10-14 PROCEDURE — 3017F COLORECTAL CA SCREEN DOC REV: CPT | Performed by: FAMILY MEDICINE

## 2022-10-14 PROCEDURE — 3023F SPIROM DOC REV: CPT | Performed by: FAMILY MEDICINE

## 2022-10-14 PROCEDURE — G8417 CALC BMI ABV UP PARAM F/U: HCPCS | Performed by: FAMILY MEDICINE

## 2022-10-14 RX ORDER — CETIRIZINE HYDROCHLORIDE 10 MG/1
1 TABLET ORAL DAILY
COMMUNITY
Start: 2022-09-27

## 2022-10-14 RX ORDER — AMMONIUM LACTATE 12 G/100G
LOTION TOPICAL
COMMUNITY
Start: 2022-09-27

## 2022-10-14 RX ORDER — CHLORAL HYDRATE 500 MG
1000 CAPSULE ORAL DAILY
Qty: 90 CAPSULE | Refills: 3 | Status: CANCELLED | OUTPATIENT
Start: 2022-10-14

## 2022-10-14 ASSESSMENT — PATIENT HEALTH QUESTIONNAIRE - PHQ9
2. FEELING DOWN, DEPRESSED OR HOPELESS: 0
SUM OF ALL RESPONSES TO PHQ QUESTIONS 1-9: 0
SUM OF ALL RESPONSES TO PHQ QUESTIONS 1-9: 0
1. LITTLE INTEREST OR PLEASURE IN DOING THINGS: 0
SUM OF ALL RESPONSES TO PHQ QUESTIONS 1-9: 0
SUM OF ALL RESPONSES TO PHQ QUESTIONS 1-9: 0
SUM OF ALL RESPONSES TO PHQ9 QUESTIONS 1 & 2: 0

## 2022-10-14 ASSESSMENT — ENCOUNTER SYMPTOMS
CONSTIPATION: 0
ABDOMINAL PAIN: 0
DIARRHEA: 0
BLOOD IN STOOL: 0
EYE ITCHING: 1
SHORTNESS OF BREATH: 0

## 2022-10-14 NOTE — PROGRESS NOTES
10/14/2022    Nancy Cast (:  1955) is a 61 y.o. female, here for evaluation of the following medical concerns:    HPI  Patient presented to the office to establish care. She has hypertension and history of multiple TIA. and takes amlodipine 5 mg daily Zocor 10 mg daily. She has no residual focal neurologic deficit. She has impaired fasting glucose but no overt sign of diabetes, denies polyuria and polydipsia. She has simple chronic bronchitis likely due to nicotine abuse, smoked less than half pack per day. She is obese and struggling to lose weight. She denies chest pain or shortness of breath. Denies bowel or urinary disturbance. Review of Systems   Constitutional:  Positive for chills. Negative for activity change and appetite change. Eyes:  Positive for itching. Negative for visual disturbance. Respiratory:  Negative for shortness of breath. Cardiovascular:  Negative for chest pain and leg swelling. Gastrointestinal:  Negative for abdominal pain, blood in stool, constipation and diarrhea. Genitourinary:  Negative for difficulty urinating, frequency, hematuria, menstrual problem and urgency. Musculoskeletal:  Positive for myalgias. Neurological:  Negative for dizziness and syncope. Psychiatric/Behavioral:  Negative for behavioral problems. Prior to Visit Medications    Medication Sig Taking?  Authorizing Provider   cetirizine (ZYRTEC) 10 MG tablet Take 1 tablet by mouth daily Yes Historical Provider, MD   ammonium lactate (LAC-HYDRIN) 12 % lotion  Yes Historical Provider, MD   Multiple Vitamin (MULTI-VITAMINS) TABS TAKE 1 TABLET BY MOUTH DAILY Yes Saud Moment, APRN - CNP   naproxen (NAPROSYN) 250 MG tablet Take 1 tablet by mouth 2 times daily as needed for Pain Yes Saud Moment, APRN - CNP   simvastatin (ZOCOR) 10 MG tablet TAKE 1 TABLET BY MOUTH DAILY Yes Suad Moment, APRN - CNP   amLODIPine (NORVASC) 5 MG tablet Take 1 tablet by mouth in the morning. Yes LYNN Gutierrez CNP   VITAMIN D HIGH POTENCY 25 MCG (1000 UT) CAPS TAKE ONE TABLET ORAL ROUTE DAILY FOR BONE HEALTH Yes LYNN Gutierrez CNP   nystatin (MYCOSTATIN) 821395 UNIT/ML suspension TAKE 5 MILLILITER BY ORAL ROUTE 4 TIMES EVERY DAY Yes Claude Bridges, MD   furosemide (LASIX) 20 MG tablet TAKE 1 TABLET BY MOUTH 2 TIMES DAILY Yes Claude Bridges, MD   Omega-3 Fatty Acids (FISH OIL) 1000 MG CAPS Take 1 capsule by mouth daily Yes LYNN Gutierrez CNP   albuterol sulfate  (90 Base) MCG/ACT inhaler INHALE 2 PUFF BY INHALATION ROUTE EVERY 4 - 6 HOURS AS NEEDED AS NEEDED FOR SHORTNESS OF BREATH Yes LYNN Gutierrez CNP   ipratropium (ATROVENT) 0.06 % nasal spray  Yes Historical Provider, MD   ketotifen (ZADITOR) 0.025 % ophthalmic solution   Historical Provider, MD   EPINEPHrine (EPIPEN 2-RENETTA) 0.3 MG/0.3ML SOAJ injection Inject 0.3 mLs into the muscle once for 1 dose Use as directed for allergic reaction  Cornell Silver MD        Allergies   Allergen Reactions    Codeine Rash and Itching    Celebrex [Celecoxib] Swelling     Facial edema    Clopidogrel Other (See Comments)     Fatigue, muscle pain    Gabapentin Other (See Comments)     Tremor    Hyzaar [Losartan Potassium-Hctz] Swelling     Tingling and swelling of lips       Past Medical History:   Diagnosis Date    Anemia     Arthritis of knee     CVA (cerebral vascular accident) ([facility])     left side weakness    Heart disease     ? TYPE    Hypertension     Rheumatoid arthritis ([facility]) 4/24/2013    Formatting of this note might be different from the original. ICD-10 Transition    Tobacco dependence        Past Surgical History:   Procedure Laterality Date    JOINT REPLACEMENT Right     Right knee    [place] CORRJ HALLUX VALGUS W/SESMDC W/2 OSTEOT Left 10/26/2018    MODIFIED YEN BUNIONECTOMY WITH FIRST METATARSAL OSTEOTOMY WITH  ANNALISA OSTEOTOMY, LEFT FOOT performed by Jay Jay MARTÍNEZ DPM at [facility] OR     TONSILLECTOMY      TUBAL LIGATION      VENTRAL HERNIA REPAIR         Social History     Socioeconomic History    Marital status: Single     Spouse name: Not on file    Number of children: Not on file    Years of education: Not on file    Highest education level: Not on file   Occupational History    Not on file   Tobacco Use    Smoking status: Former     Packs/day: 0.25     Years: 22.00     Pack years: 5.50     Types: Cigarettes     Quit date: 2021     Years since quittin.2    Smokeless tobacco: Never    Tobacco comments:     3-4 cigarettes per day   Vaping Use    Vaping Use: Never used   Substance and Sexual Activity    Alcohol use: Yes     Comment: OCC BEER    Drug use: No    Sexual activity: Not Currently     Comment: SINGLE   Other Topics Concern    Not on file   Social History Narrative    Not on file     Social Determinants of Health     Financial Resource Strain: Low Risk     Difficulty of Paying Living Expenses: Not hard at all   Food Insecurity: Food Insecurity Present    Worried About [address] in the Last Year: Often true    Ran Out of Food in the Last Year: Sometimes true   Transportation Needs: Not on file   Physical Activity: Not on file   Stress: Not on file   Social Connections: Not on file   Intimate Partner Violence: Not on file   Housing Stability: Not on file        Family History   Problem Relation Age of Onset    Cancer Mother         LUNG    Heart Disease Father     High Blood Pressure Father     Diabetes Father     High Blood Pressure Brother     Stroke Paternal Grandmother        Vitals:    10/14/22 1345   BP: 117/72   Pulse: 72   Temp: 97.8 °F (36.6 °C)   SpO2: 99%   Weight: 242 lb 3.2 oz (109.9 kg)     Estimated body mass index is 39.09 kg/m² as calculated from the following:    Height as of 22: 5' 6\" (1.676 m). Weight as of this encounter: 242 lb 3.2 oz (109.9 kg). Physical Exam  Constitutional:       Appearance: She is well-developed.    HENT:      Head: Normocephalic. Right Ear: External ear normal.      Nose: Nose normal.   Eyes:      Conjunctiva/sclera: Conjunctivae normal.      Pupils: Pupils are equal, round, and reactive to light. Neck:      Thyroid: No thyromegaly. Cardiovascular:      Rate and Rhythm: Normal rate and regular rhythm. Heart sounds: Normal heart sounds. No murmur heard. No friction rub. Pulmonary:      Effort: Pulmonary effort is normal.      Breath sounds: Normal breath sounds. Abdominal:      General: Bowel sounds are normal.      Palpations: Abdomen is soft. There is no mass. Musculoskeletal:         General: Normal range of motion. Cervical back: Normal range of motion and neck supple. Lymphadenopathy:      Cervical: No cervical adenopathy. Skin:     General: Skin is warm. Findings: No rash. Neurological:      Mental Status: She is alert and oriented to person, place, and time. ASSESSMENT/PLAN:  1. Encounter to establish care  Need fasting blood test next visit December    2. Essential hypertension  Controlled. Continue amlodipine 5 mg daily. 3. Impaired fasting glucose  No overt sign of diabetes, denies polyuria polydipsia. HbA1c was 5.5% 5/11/2022. Will continue to monitor blood sugar    4. Chronic rhinitis  Patient reported cord symptoms also complain of what appears to be allergic conjunctivitis, did not tolerate ketotifen ophthalmic solution. Try Zyrtec 10 mg daily. May take Tylenol or naproxen as needed. 5. Simple chronic bronchitis ([address]  Likely due to nicotine abuse unfortunately she still smoke AGAINST MEDICAL ADVICE ( < 1/2 PPD)  . She uses albuterol inhaler as needed    6. Obesity (BMI 30-39. 9)  Encouraged to lose weight with diet and exercise    7. History of transient ischemic attack (TIA)  Continue a statin Zocor 10 mg daily and aspirin 81 mg daily    8. Needs flu shot  - Influenza, FLUCELVAX, (age 10 mo+), IM, Preservative Free, 0.5 mL      RTC in 3 mos.  Blood test next visit    An  electronic signature was used to authenticate this note.    --Angy Carlos MD on 10/14/2022 at 2:38 PM

## 2022-12-09 ENCOUNTER — OFFICE VISIT (OUTPATIENT)
Dept: PRIMARY CARE CLINIC | Age: 67
End: 2022-12-09

## 2022-12-09 VITALS
OXYGEN SATURATION: 98 % | BODY MASS INDEX: 37.61 KG/M2 | HEART RATE: 68 BPM | TEMPERATURE: 97.7 F | DIASTOLIC BLOOD PRESSURE: 80 MMHG | HEIGHT: 67 IN | SYSTOLIC BLOOD PRESSURE: 118 MMHG | WEIGHT: 239.6 LBS

## 2022-12-09 DIAGNOSIS — F17.200 TOBACCO DEPENDENCE: ICD-10-CM

## 2022-12-09 DIAGNOSIS — G45.9 TIA (TRANSIENT ISCHEMIC ATTACK): ICD-10-CM

## 2022-12-09 DIAGNOSIS — E66.9 OBESITY (BMI 30-39.9): ICD-10-CM

## 2022-12-09 DIAGNOSIS — E78.5 HYPERLIPIDEMIA LDL GOAL <100: ICD-10-CM

## 2022-12-09 DIAGNOSIS — I10 ESSENTIAL HYPERTENSION: ICD-10-CM

## 2022-12-09 DIAGNOSIS — J41.0 SIMPLE CHRONIC BRONCHITIS (HCC): ICD-10-CM

## 2022-12-09 DIAGNOSIS — Z00.00 INITIAL MEDICARE ANNUAL WELLNESS VISIT: Primary | ICD-10-CM

## 2022-12-09 PROBLEM — T78.40XA ALLERGIC REACTION: Status: RESOLVED | Noted: 2022-05-11 | Resolved: 2022-12-09

## 2022-12-09 RX ORDER — ROSUVASTATIN CALCIUM 10 MG/1
10 TABLET, COATED ORAL NIGHTLY
Qty: 90 TABLET | Refills: 1 | Status: SHIPPED | OUTPATIENT
Start: 2022-12-09

## 2022-12-09 RX ORDER — CHLORTHALIDONE 25 MG/1
25 TABLET ORAL DAILY
Qty: 90 TABLET | Refills: 1 | Status: SHIPPED | OUTPATIENT
Start: 2022-12-09

## 2022-12-09 RX ORDER — ROSUVASTATIN CALCIUM 10 MG/1
10 TABLET, COATED ORAL NIGHTLY
Qty: 30 TABLET | Refills: 3 | Status: SHIPPED | OUTPATIENT
Start: 2022-12-09 | End: 2022-12-09 | Stop reason: SDUPTHER

## 2022-12-09 RX ORDER — PENICILLIN V POTASSIUM 500 MG/1
TABLET ORAL
COMMUNITY

## 2022-12-09 ASSESSMENT — PATIENT HEALTH QUESTIONNAIRE - PHQ9
2. FEELING DOWN, DEPRESSED OR HOPELESS: 0
SUM OF ALL RESPONSES TO PHQ9 QUESTIONS 1 & 2: 0
SUM OF ALL RESPONSES TO PHQ QUESTIONS 1-9: 0
SUM OF ALL RESPONSES TO PHQ QUESTIONS 1-9: 0
1. LITTLE INTEREST OR PLEASURE IN DOING THINGS: 0
SUM OF ALL RESPONSES TO PHQ QUESTIONS 1-9: 0
SUM OF ALL RESPONSES TO PHQ QUESTIONS 1-9: 0

## 2022-12-09 ASSESSMENT — LIFESTYLE VARIABLES
HOW MANY STANDARD DRINKS CONTAINING ALCOHOL DO YOU HAVE ON A TYPICAL DAY: 1 OR 2
HOW OFTEN DO YOU HAVE A DRINK CONTAINING ALCOHOL: MONTHLY OR LESS

## 2022-12-09 NOTE — PATIENT INSTRUCTIONS
Learning About Vision Tests  What are vision tests? The four most common vision tests are visual acuity tests, refraction, visual field tests, and color vision tests. Visual acuity (sharpness) tests  These tests are used: To see if you need glasses or contact lenses. To monitor an eye problem. To check an eye injury. Visual acuity tests are done as part of routine exams. You may also have this test when you get your 's license or apply for some types of jobs. Visual field tests  These tests are used: To check for vision loss in any area of your range of vision. To screen for certain eye diseases. To look for nerve damage after a stroke, head injury, or other problem that could reduce blood flow to the brain. Refraction and color tests  A refraction test is done to find the right prescription for glasses and contact lenses. A color vision test is done to check for color blindness. Color vision is often tested as part of a routine exam. You may also have this test when you apply for a job where recognizing different colors is important, such as , electronics, or the [employer]. How are vision tests done? Visual acuity test   You cover one eye at a time. You read aloud from a wall chart across the room. You read aloud from a small card that you hold in your hand. Refraction   You look into a special device. The device puts lenses of different strengths in front of each eye to see how strong your glasses or contact lenses need to be. Visual field tests   Your doctor may have you look through special machines. Or your doctor may simply have you stare straight ahead while they move a finger into and out of your field of vision. Color vision test   You look at pieces of printed test patterns in various colors. You say what number or symbol you see. Your doctor may have you trace the number or symbol using a pointer. How do these tests feel?   There is very little chance of having a problem from this test. If dilating drops are used for a vision test, they may make the eyes sting and cause a medicine taste in the mouth. Follow-up care is a key part of your treatment and safety. Be sure to make and go to all appointments, and call your doctor if you are having problems. It's also a good idea to know your test results and keep a list of the medicines you take. Where can you learn more? Go to http://www.hobbs.com/ and enter G551 to learn more about \"Learning About Vision Tests. \"  Current as of: October 12, 2022               Content Version: 13.5  © 4870-3370 [employer]. Care instructions adapted under license by [facility] ([facility]). If you have questions about a medical condition or this instruction, always ask your healthcare professional. [address] any warranty or liability for your use of this information. Learning About Activities of Daily Living  What are activities of daily living? Activities of daily living (ADLs) are the basic self-care tasks you do every day. As you age, and if you have health problems, you may find that it's harder to do these things for yourself. That's when you may need some help. Your doctor uses ADLs to measure how much help you need. Knowing what you can and can't do for yourself is an important first step to getting help. And when you have the help you need, you can stay as independent as possible. Your doctor will want to know if you are able to do tasks such as: Take a bath or shower without help. Go to the bathroom by yourself. Dress and undress without help. Shave, comb your hair, and brush teeth on your own. Get in and out of bed or a chair without help. Feed yourself without help. If you are having trouble doing basic self-care tasks, talk with your doctor. You may want to bring a caregiver or family member who can help the doctor understand your needs and abilities.   How will a doctor assess your ADLs? Asking about ADLs is part of a routine health checkup your doctor will likely do as you age. Your health check might be done in a doctor's office, in your home, or at a hospital. The goal is to find out if you are having any problems that could make your health problems worse or that make it unsafe for you to be on your own. To measure your ADLs, your doctor will ask how hard it is for you to do routine tasks. He or she may also want to know if you have changed the way you do a task because of a health problem. He or she may watch how you:  Walk back and forth. Keep your balance while you stand or walk. Move from sitting to standing or from a bed to a chair. Button or unbutton a shirt or sweater. Remove and put on your shoes. It's normal to feel a little worried or anxious if you find you can't do all the things you used to be able to do. Talking with your doctor about ADLs isn't a test that you either pass or fail. It's just a way to get more information about your health and safety. Follow-up care is a key part of your treatment and safety. Be sure to make and go to all appointments, and call your doctor if you are having problems. It's also a good idea to know your test results and keep a list of the medicines you take. Current as of: October 6, 2021               Content Version: 13.5  © 2006-2022 [employer]. Care instructions adapted under license by [facility] ([facility]). If you have questions about a medical condition or this instruction, always ask your healthcare professional. [address] any warranty or liability for your use of this information. Advance Directives: Care Instructions  Overview  An advance directive is a legal way to state your wishes at the end of your life. It tells your family and your doctor what to do if you can't say what you want. There are two main types of advance directives.  You can change them any time your wishes change. Living will. This form tells your family and your doctor your wishes about life support and other treatment. The form is also called a declaration. Medical power of . This form lets you name a person to make treatment decisions for you when you can't speak for yourself. This person is called a health care agent (health care proxy, health care surrogate). The form is also called a durable power of  for health care. If you do not have an advance directive, decisions about your medical care may be made by a family member, or by a doctor or a  who doesn't know you. It may help to think of an advance directive as a gift to the people who care for you. If you have one, they won't have to make tough decisions by themselves. For more information, including forms for your state, see the [address] website (www.caringinfo.org/planning/advance-directives/). Follow-up care is a key part of your treatment and safety. Be sure to make and go to all appointments, and call your doctor if you are having problems. It's also a good idea to know your test results and keep a list of the medicines you take. What should you include in an advance directive? Many states have a unique advance directive form. (It may ask you to address specific issues.) Or you might use a universal form that's approved by many states. If your form doesn't tell you what to address, it may be hard to know what to include in your advance directive. Use the questions below to help you get started. Who do you want to make decisions about your medical care if you are not able to? What life-support measures do you want if you have a serious illness that gets worse over time or can't be cured? What are you most afraid of that might happen? (Maybe you're afraid of having pain, losing your independence, or being kept alive by machines.)  Where would you prefer to die? (Your home?  A hospital? A nursing home?)  Do you want to donate your organs when you die? Do you want certain [employer] practices performed before you die? When should you call for help? Be sure to contact your doctor if you have any questions. Where can you learn more? Go to http://www.hobbs.com/ and enter R264 to learn more about \"Advance Directives: Care Instructions. \"  Current as of: June 16, 2022               Content Version: 13.5  © 7289-4101 Healthwise, Incorporated. Care instructions adapted under license by [facility] ([facility]). If you have questions about a medical condition or this instruction, always ask your healthcare professional. [address] any warranty or liability for your use of this information. Personalized Preventive Plan for Mitchell Prieto - 12/9/2022  Medicare offers a range of preventive health benefits. Some of the tests and screenings are paid in full while other may be subject to a deductible, co-insurance, and/or copay. Some of these benefits include a comprehensive review of your medical history including lifestyle, illnesses that may run in your family, and various assessments and screenings as appropriate. After reviewing your medical record and screening and assessments performed today your provider may have ordered immunizations, labs, imaging, and/or referrals for you. A list of these orders (if applicable) as well as your Preventive Care list are included within your After Visit Summary for your review. Other Preventive Recommendations:    A preventive eye exam performed by an eye specialist is recommended every 1-2 years to screen for glaucoma; cataracts, macular degeneration, and other eye disorders. A preventive dental visit is recommended every 6 months. Try to get at least 150 minutes of exercise per week or 10,000 steps per day on a pedometer .   Order or download the FREE \"Exercise & Physical Activity: Your Everyday Guide\" from [employer]. Call 5-138.797.6964 or search [employer] online. You need 2933-0176 mg of calcium and 2681-5899 IU of vitamin D per day. It is possible to meet your calcium requirement with diet alone, but a vitamin D supplement is usually necessary to meet this goal.  When exposed to the sun, use a sunscreen that protects against both UVA and UVB radiation with an SPF of 30 or greater. Reapply every 2 to 3 hours or after sweating, drying off with a towel, or swimming. Always wear a seat belt when traveling in a car. Always wear a helmet when riding a bicycle or motorcycle.

## 2022-12-09 NOTE — PROGRESS NOTES
Medicare Annual Wellness Visit    Kemi Adan is here for Medicare AWV    Assessment & Plan   Initial Medicare annual wellness visit  Patient is mildly forgetful but no sign of dementia following TIA. Her recent memory still intact. She lives by herself and is able to do basic activities of daily living. Is no safety concern at this time. Essential hypertension  Patient reported that amlodipine might also cause lip swelling like the losartan and would like to switch medication to something different. We will try chlorthalidone 25 mg daily  -     chlorthalidone (HYGROTON) 25 MG tablet; Take 1 tablet by mouth daily, Disp-90 tablet, R-1Normal    Simple chronic bronchitis (HCC)  Tobacco dependence  -Unfortunately she still smoke AGAINST MEDICAL ADVICE 1/2 pack/day. She takes albuterol inhaler as needed. Obesity (BMI 30-39. 9)  Encouraged to lose weight with diet and exercise. TIA (transient ischemic attack)  She has no focal neurologic deficit. Continue statin and aspirin. Hyperlipidemia LDL goal <100  Because of potential interaction with amlodipine we will switch Zocor to Crestor 10 mg daily. Will check lipid panel next blood draw. -     rosuvastatin (CRESTOR) 10 MG tablet; Take 1 tablet by mouth nightly, Disp-90 tablet, R-1Normal      Recommendations for Preventive Services Due: see orders and patient instructions/AVS.  Recommended screening schedule for the next 5-10 years is provided to the patient in written form: see Patient Instructions/AVS.     Return in 1 year (on 12/9/2023) for Medicare Annual Wellness Visit in 1 year. Subjective   The following acute and/or chronic problems were also addressed today:  Was recently seen at the urgent care due to sinus and ear infection was prescribed parenteral antibiotics and was sent home on doxycycline. She also was prescribed an eardrop.   Patient has hypertension was taking amlodipine but patient is concerned it might cause lip swelling like losartan and would like to switch to another product. She has hyperlipidemia and takes Zocor 10 mg daily tolerating medication without side effect. She has chronic bronchitis unfortunately she still smoke AGAINST MEDICAL ADVICE, takes albuterol inhaler as needed, denies exacerbation or flareup of the breathing problem. She denies headache nausea or vomiting. Denies chest pain or shortness of breath. Denies bowel or urinary disturbance. Patient's complete Health Risk Assessment and screening values have been reviewed and are found in Flowsheets. The following problems were reviewed today and where indicated follow up appointments were made and/or referrals ordered. Positive Risk Factor Screenings with Interventions:       Cognitive: Words recalled: 3 Words Recalled   Clock Drawing Test (CDT): (!) Abnormal ( borderline?)   Total Score: 3   Total Score Interpretation: Normal Mini-Cog      Interventions:  Patient declines any further evaluation or treatment             Weight and Activity:  Physical Activity: Sufficiently Active    Days of Exercise per Week: 4 days    Minutes of Exercise per Session: 40 min     On average, how many days per week do you engage in moderate to strenuous exercise (like a brisk walk)?: 4 days  Have you lost any weight without trying in the past 3 months?: (!) Yes  Body mass index: (!) 38.09  Obesity Interventions:  Patient declines any further evaluation or treatment            Vision Screen:  Do you have difficulty driving, watching TV, or doing any of your daily activities because of your eyesight?: (!) Yes  Have you had an eye exam within the past year?: (!) No  No results found. Interventions:   Patient encouraged to make appointment with their eye specialist  See AVS for additional education material  See A/P for any pertinent orders     ADL's:   Patient reports needing help with:  Select all that apply: (!) Transportation, Taking Medications  Interventions:  Has transportation services thru \" [employer]"    See AVS for additional education material  See A/P for plan and any pertinent orders    Advanced Directives:  Do you have a Living Will?: (!) No    Intervention:  has NO advanced directive - not interested in additional information  : 'next time I come back\"                   Objective   Vitals:    12/09/22 1410   BP: 118/80   Pulse: 68   Temp: 97.7 °F (36.5 °C)   TempSrc: Temporal   SpO2: 98%   Weight: 239 lb 9.6 oz (108.7 kg)   Height: 5' 6.5\" (1.689 m)      Body mass index is 38.09 kg/m².       Physical Exam  General Appearance: alert and oriented to person, place and time, well developed and well- nourished, in no acute distress  Skin: warm and dry, no rash or erythema  Head: normocephalic and atraumatic  Eyes: pupils equal, round, and reactive to light, extraocular eye movements intact, conjunctivae normal  ENT: tympanic membrane, external ear and ear canal normal bilaterally, nose without deformity, nasal mucosa and turbinates normal without polyps  Neck: supple and non-tender without mass, no thyromegaly or thyroid nodules, no cervical lymphadenopathy  Pulmonary/Chest: clear to auscultation bilaterally- no wheezes, rales or rhonchi, normal air movement, no respiratory distress  Cardiovascular: normal rate, regular rhythm, normal S1 and S2, no murmurs, rubs, clicks, or gallops, distal pulses intact, no carotid bruits  Abdomen: soft, non-tender, non-distended, normal bowel sounds, no masses or organomegaly  Extremities: no cyanosis, clubbing or edema  Musculoskeletal: normal range of motion, no joint swelling, deformity or tenderness  Neurologic: reflexes normal and symmetric, no cranial nerve deficit, gait, coordination and speech normal       Allergies   Allergen Reactions    Codeine Rash and Itching    Celebrex [Celecoxib] Swelling     Facial edema    Clopidogrel Other (See Comments)     Fatigue, muscle pain    Gabapentin Other (See Comments)     Tremor Hyzaar [Losartan Potassium-Hctz] Swelling     Tingling and swelling of lips    Losartan     Naproxen      Prior to Visit Medications    Medication Sig Taking?  Authorizing Provider   chlorthalidone (HYGROTON) 25 MG tablet Take 1 tablet by mouth daily Yes Javier Kruse MD   rosuvastatin (CRESTOR) 10 MG tablet Take 1 tablet by mouth nightly Yes Javier Kruse MD   albuterol sulfate  (90 Base) MCG/ACT inhaler INHALE 2 PUFF BY INHALATION ROUTE EVERY 4 - 6 HOURS AS NEEDED AS NEEDED FOR SHORTNESS OF BREATH Yes LYNN Brice CNP   penicillin v potassium (VEETID) 500 MG tablet penicillin V potassium 500 mg tablet  Historical Provider, MD   cetirizine (ZYRTEC) 10 MG tablet Take 1 tablet by mouth daily  Historical Provider, MD   ammonium lactate (LAC-HYDRIN) 12 % lotion   Historical Provider, MD   Multiple Vitamin (MULTI-VITAMINS) TABS TAKE 1 TABLET BY MOUTH DAILY  LYNN rBice CNP   naproxen (NAPROSYN) 250 MG tablet Take 1 tablet by mouth 2 times daily as needed for Pain  LYNN Brice CNP   ketotifen (ZADITOR) 0.025 % ophthalmic solution   Historical Provider, MD   VITAMIN D HIGH POTENCY 25 MCG (1000 UT) CAPS TAKE ONE TABLET ORAL ROUTE [address] [facility] M LYNN Duncan CNP   furosemide (LASIX) 20 MG tablet TAKE 1 TABLET BY MOUTH 2 TIMES DAILY  Phong Scott MD   EPINEPHrine (EPIPEN 2-RENETTA) 0.3 MG/0.3ML SOAJ injection Inject 0.3 mLs into the muscle once for 1 dose Use as directed for allergic reaction  Gilmar Douglas MD   Omega-3 Fatty Acids (FISH OIL) 1000 MG CAPS Take 1 capsule by mouth daily  LYNN Brice CNP   ipratropium (ATROVENT) 0.06 % nasal spray   Historical Provider, MD Nelson (Including outside providers/suppliers regularly involved in providing care):   Patient Care Team:  Javier Kruse MD as PCP - General (Internal Medicine)  Javier Kruse MD as PCP - REHABILITATION [facility] Empaneled Provider     Reviewed and updated this visit:  Allergies  Meds

## 2022-12-20 DIAGNOSIS — E78.5 TYPE 2 DIABETES MELLITUS WITH HYPERLIPIDEMIA (HCC): ICD-10-CM

## 2022-12-20 DIAGNOSIS — E11.69 TYPE 2 DIABETES MELLITUS WITH HYPERLIPIDEMIA (HCC): ICD-10-CM

## 2023-01-16 RX ORDER — DIPHENOXYLATE HYDROCHLORIDE AND ATROPINE SULFATE 2.5; .025 MG/1; MG/1
TABLET ORAL
Qty: 90 TABLET | Refills: 1 | Status: SHIPPED | OUTPATIENT
Start: 2023-01-16

## 2023-03-14 RX ORDER — ALBUTEROL SULFATE 90 UG/1
AEROSOL, METERED RESPIRATORY (INHALATION)
Qty: 18 G | Refills: 1 | Status: SHIPPED | OUTPATIENT
Start: 2023-03-14

## 2023-04-11 ENCOUNTER — HOSPITAL ENCOUNTER (EMERGENCY)
Age: 68
Discharge: HOME | End: 2023-04-11
Attending: EMERGENCY MEDICINE
Payer: COMMERCIAL

## 2023-04-11 VITALS
SYSTOLIC BLOOD PRESSURE: 143 MMHG | WEIGHT: 238.98 LBS | OXYGEN SATURATION: 98 % | BODY MASS INDEX: 38.41 KG/M2 | DIASTOLIC BLOOD PRESSURE: 72 MMHG | HEART RATE: 72 BPM | RESPIRATION RATE: 16 BRPM | HEIGHT: 66 IN

## 2023-04-11 DIAGNOSIS — T78.40XA ALLERGIC REACTION, INITIAL ENCOUNTER: Primary | ICD-10-CM

## 2023-04-11 PROCEDURE — 99283 EMERGENCY DEPT VISIT LOW MDM: CPT

## 2023-04-11 PROCEDURE — 6370000000 HC RX 637 (ALT 250 FOR IP): Performed by: EMERGENCY MEDICINE

## 2023-04-11 RX ORDER — DIPHENHYDRAMINE HCL 25 MG
50 TABLET ORAL ONCE
Status: COMPLETED | OUTPATIENT
Start: 2023-04-11 | End: 2023-04-11

## 2023-04-11 RX ORDER — PREDNISONE 20 MG/1
40 TABLET ORAL ONCE
Status: COMPLETED | OUTPATIENT
Start: 2023-04-11 | End: 2023-04-11

## 2023-04-11 RX ORDER — DIPHENHYDRAMINE HCL 25 MG
25-50 CAPSULE ORAL EVERY 4 HOURS PRN
Qty: 1 CAPSULE | Refills: 0 | Status: SHIPPED | OUTPATIENT
Start: 2023-04-11 | End: 2023-04-26

## 2023-04-11 RX ORDER — PREDNISONE 10 MG/1
TABLET ORAL
Qty: 8 TABLET | Refills: 0 | Status: SHIPPED | OUTPATIENT
Start: 2023-04-11 | End: 2023-04-13

## 2023-04-11 RX ORDER — FAMOTIDINE 20 MG/1
20 TABLET, FILM COATED ORAL ONCE
Status: COMPLETED | OUTPATIENT
Start: 2023-04-11 | End: 2023-04-11

## 2023-04-11 RX ADMIN — FAMOTIDINE 20 MG: 20 TABLET, FILM COATED ORAL at 17:06

## 2023-04-11 RX ADMIN — PREDNISONE 40 MG: 20 TABLET ORAL at 17:04

## 2023-04-11 RX ADMIN — DIPHENHYDRAMINE HYDROCHLORIDE 50 MG: 25 TABLET ORAL at 17:06

## 2023-04-11 ASSESSMENT — LIFESTYLE VARIABLES: HOW OFTEN DO YOU HAVE A DRINK CONTAINING ALCOHOL: NEVER

## 2023-04-11 ASSESSMENT — PAIN - FUNCTIONAL ASSESSMENT: PAIN_FUNCTIONAL_ASSESSMENT: NONE - DENIES PAIN

## 2023-04-11 ASSESSMENT — PAIN SCALES - GENERAL: PAINLEVEL_OUTOF10: 0

## 2023-04-11 NOTE — ED PROVIDER NOTES
[id number]          ATTENDING PHYSICIAN NOTE       Date of evaluation: 4/11/2023    Chief Complaint     Allergic Reaction (Pt c/o lip swelling from possible allergic reaction. States it started around 2100 last night, Pt took a benadryl with no relief. States lips have become more swollen. No SOB, no tongue swelling. States she has not eaten anything new or taken any new medications.)      History of Present Illness     Rachel Gaspar is a 74 y.o. female who presents with concern for allergic reaction. The patient reports a history of angioedema. In looking through the patient's past medical record, seems at some point she was diagnosed with angioedema, maybe in early 2022. She reports that she was referred to an allergist.  She had some recurrence of symptoms seen in our ED in May 2022 although her symptoms had resolved at the time of her evaluation. She saw her PCP in August 2022 and was still occasionally having symptoms. PCP notes that no clear cause was identified. At that time the patient was on Hyzaar which is a combination ARB and hydrochlorothiazide. Her PCP discontinued this medication and switched her to amlodipine. In December 2022 she asked to be switched off of amlodipine out of concern that it would also cause angioedema. At this time she was placed on chlorthalidone per the medical record. The patient reports that she is still taking amlodipine she has not been taking it regularly up until the last couple of days because of concern it would cause angioedema. She states that she has taken a couple of doses in the last few days due to headache from her blood pressure being elevated. Last night she had a ham sandwich with Dhillon on it and some milk and states that her lower lip started to swell a few hours after she ate the dinner. Then it got worse this morning after she ate breakfast.  She has had no new foods, meds, other ingestions.   She has

## 2023-04-11 NOTE — DISCHARGE INSTRUCTIONS
Recommend continuing blood pressure medication as prescribed by primary care doctor or discussing new blood pressure medication with your primary care doctor if you have concerns about your current medication.

## 2024-01-03 RX ORDER — MULTIVITAMIN WITH FOLIC ACID 400 MCG
1 TABLET ORAL DAILY
Qty: 90 TABLET | Refills: 3 | OUTPATIENT
Start: 2024-01-03

## 2024-01-03 NOTE — TELEPHONE ENCOUNTER
Last OV:  12/09/2022.       No future appts listed.     When pt makes an appt, please let me know.  Will send Rx until she comes in.

## 2024-04-22 RX ORDER — MULTIVITAMIN WITH FOLIC ACID 400 MCG
1 TABLET ORAL DAILY
Qty: 90 TABLET | Refills: 11 | OUTPATIENT
Start: 2024-04-22

## 2024-04-22 NOTE — TELEPHONE ENCOUNTER
Last OV:  12/09/2022    No future appts listed.     When patient makes an appt, let me know.  Will send in a 30-day supply until she comes in for appt.

## 2024-06-26 RX ORDER — MULTIVITAMIN WITH FOLIC ACID 400 MCG
1 TABLET ORAL DAILY
Qty: 90 TABLET | Refills: 11 | OUTPATIENT
Start: 2024-06-26

## 2024-06-26 NOTE — TELEPHONE ENCOUNTER
Last OV:  12/09/22    No future appts listed.     Needs fasting labs    When patient makes an appt, please let us know.  Will send in enough meds until she comes in.

## 2024-11-28 ENCOUNTER — APPOINTMENT (OUTPATIENT)
Dept: CT IMAGING | Age: 69
End: 2024-11-28
Payer: COMMERCIAL

## 2024-11-28 ENCOUNTER — HOSPITAL ENCOUNTER (EMERGENCY)
Age: 69
Discharge: HOME | End: 2024-11-28
Payer: COMMERCIAL

## 2024-11-28 VITALS
HEART RATE: 78 BPM | WEIGHT: 240 LBS | SYSTOLIC BLOOD PRESSURE: 104 MMHG | RESPIRATION RATE: 16 BRPM | TEMPERATURE: 98.1 F | BODY MASS INDEX: 38.74 KG/M2 | OXYGEN SATURATION: 98 % | DIASTOLIC BLOOD PRESSURE: 71 MMHG

## 2024-11-28 DIAGNOSIS — F10.10 ALCOHOL ABUSE: Primary | ICD-10-CM

## 2024-11-28 LAB
ALBUMIN SERPL-MCNC: 4.1 G/DL (ref 3.4–5)
ALBUMIN/GLOB SERPL: 1.7 {RATIO} (ref 1.1–2.2)
ALP SERPL-CCNC: 73 U/L (ref 40–129)
ALT SERPL-CCNC: 19 U/L (ref 10–40)
ANION GAP SERPL CALCULATED.3IONS-SCNC: 15 MMOL/L (ref 3–16)
AST SERPL-CCNC: 33 U/L (ref 15–37)
BASOPHILS # BLD: 0.1 K/UL (ref 0–0.2)
BASOPHILS NFR BLD: 1.2 %
BILIRUB SERPL-MCNC: 0.8 MG/DL (ref 0–1)
BUN SERPL-MCNC: 10 MG/DL (ref 7–20)
CALCIUM SERPL-MCNC: 9.5 MG/DL (ref 8.3–10.6)
CHLORIDE SERPL-SCNC: 110 MMOL/L (ref 99–110)
CO2 SERPL-SCNC: 20 MMOL/L (ref 21–32)
CREAT SERPL-MCNC: 0.9 MG/DL (ref 0.6–1.2)
DEPRECATED RDW RBC AUTO: 14.3 % (ref 12.4–15.4)
EOSINOPHIL # BLD: 0.1 K/UL (ref 0–0.6)
EOSINOPHIL NFR BLD: 1.5 %
ETHANOLAMINE SERPL-MCNC: 245 MG/DL (ref 0–0.08)
GFR SERPLBLD CREATININE-BSD FMLA CKD-EPI: 70 ML/MIN/{1.73_M2}
GLUCOSE SERPL-MCNC: 115 MG/DL (ref 70–99)
HCT VFR BLD AUTO: 39 % (ref 36–48)
HGB BLD-MCNC: 13.1 G/DL (ref 12–16)
LYMPHOCYTES # BLD: 2.7 K/UL (ref 1–5.1)
LYMPHOCYTES NFR BLD: 54 %
MCH RBC QN AUTO: 30 PG (ref 26–34)
MCHC RBC AUTO-ENTMCNC: 33.6 G/DL (ref 31–36)
MCV RBC AUTO: 89.2 FL (ref 80–100)
MONOCYTES # BLD: 0.3 K/UL (ref 0–1.3)
MONOCYTES NFR BLD: 6.2 %
NEUTROPHILS # BLD: 1.8 K/UL (ref 1.7–7.7)
NEUTROPHILS NFR BLD: 37.1 %
PLATELET # BLD AUTO: 260 K/UL (ref 135–450)
PMV BLD AUTO: 8.3 FL (ref 5–10.5)
POTASSIUM SERPL-SCNC: 4 MMOL/L (ref 3.5–5.1)
PROT SERPL-MCNC: 6.5 G/DL (ref 6.4–8.2)
RBC # BLD AUTO: 4.37 M/UL (ref 4–5.2)
SODIUM SERPL-SCNC: 145 MMOL/L (ref 136–145)
WBC # BLD AUTO: 4.9 K/UL (ref 4–11)

## 2024-11-28 PROCEDURE — 85025 COMPLETE CBC W/AUTO DIFF WBC: CPT

## 2024-11-28 PROCEDURE — 80053 COMPREHEN METABOLIC PANEL: CPT

## 2024-11-28 PROCEDURE — 72125 CT NECK SPINE W/O DYE: CPT

## 2024-11-28 PROCEDURE — 99284 EMERGENCY DEPT VISIT MOD MDM: CPT

## 2024-11-28 PROCEDURE — 70450 CT HEAD/BRAIN W/O DYE: CPT

## 2024-11-28 PROCEDURE — 82077 ASSAY SPEC XCP UR&BREATH IA: CPT

## 2024-11-28 RX ORDER — ONDANSETRON 2 MG/ML
4 INJECTION INTRAMUSCULAR; INTRAVENOUS ONCE
Status: DISCONTINUED | OUTPATIENT
Start: 2024-11-28 | End: 2024-11-28 | Stop reason: HOSPADM

## 2024-11-28 ASSESSMENT — PAIN - FUNCTIONAL ASSESSMENT
PAIN_FUNCTIONAL_ASSESSMENT: NONE - DENIES PAIN
PAIN_FUNCTIONAL_ASSESSMENT: NONE - DENIES PAIN

## 2024-11-28 NOTE — ED PROVIDER NOTES
[facility] EMERGENCY DEPARTMENT  EMERGENCY DEPARTMENT ENCOUNTER        Pt Name: Greta Jackson  MRN: [id number]  Birthdate 1955  Date of evaluation: 11/28/2024  Provider: Lyndsay Puentes PA-C  PCP: Oumar Sloomon MD  Note Started: 6:45 PM EST 11/28/24      RUSSELL. I have evaluated this patient.        CHIEF COMPLAINT       Chief Complaint   Patient presents with    Vomiting     Family called EMS stating that patient was lying on the ground and vomiting.  Family states could be alcohol related but patient denies drinking today.         HISTORY OF PRESENT ILLNESS: 1 or more Elements     History From: Patient            Chief Complaint: Vomiting and alcohol intoxication    Greta Jackson is a 69 y.o. female who presents to the ED with a concern of alcohol intoxication, the patient said that she was drinking vodka with a friend and then she started vomiting, denies drug use.  Patient states she usually does not drink alcohol  Denies chest pain, shortness of breath, abdominal pain, headaches, dizziness, blurry vision, numbness or tingling in all extremities, chills, urinary or bowel symptoms.  Patient is therapy and confused where she was brought to the emergency room.    Nursing Notes were all reviewed and agreed with or any disagreements were addressed in the HPI.    REVIEW OF SYSTEMS :      Review of Systems    Positives and Pertinent negatives as per HPI.     SURGICAL HISTORY     Past Surgical History:   Procedure Laterality Date    JOINT REPLACEMENT Right     Right knee    MS CORRJ HALLUX VALGUS W/SESMDC W/2 OSTEOT Left 10/26/2018    MODIFIED YEN BUNIONECTOMY WITH FIRST METATARSAL OSTEOTOMY WITH  ANNALISA OSTEOTOMY, LEFT FOOT performed by Jay Jay MARTÍNEZ DPM at [facility] OR    TONSILLECTOMY      TUBAL LIGATION      VENTRAL HERNIA REPAIR         CURRENTMEDICATIONS       Discharge Medication List as of 11/28/2024  7:15 PM        CONTINUE these medications which have NOT CHANGED

## 2024-11-28 NOTE — DISCHARGE INSTRUCTIONS
Please follow-up with PCP for reassessment after ED visit.    Return if experience new or worsening symptom

## 2025-03-26 DIAGNOSIS — I10 ESSENTIAL HYPERTENSION: ICD-10-CM

## 2025-03-26 RX ORDER — CHLORTHALIDONE 25 MG/1
25 TABLET ORAL DAILY
Qty: 90 TABLET | Refills: 11 | OUTPATIENT
Start: 2025-03-26

## (undated) DEVICE — HYPODERMIC SAFETY NEEDLE: Brand: MAGELLAN

## (undated) DEVICE — BANDAGE GZ W2XL75IN ST RAYON POLY CNFRM STRTCH LTWT

## (undated) DEVICE — TOWEL,OR,DSP,ST,BLUE,STD,4/PK,20PK/CS: Brand: MEDLINE

## (undated) DEVICE — 3 ML SYRINGE LUER-LOCK TIP: Brand: MONOJECT

## (undated) DEVICE — TUBE IRRIG HNDPC HI FLO TP INTRPULS W/SUCTION TUBE

## (undated) DEVICE — STOCKINETTE,IMPERVIOUS,12X48,STERILE: Brand: MEDLINE

## (undated) DEVICE — SUTURE VCRL SZ 3-0 L18IN ABSRB UD PS-2 L19MM 3/8 CRV PRIM J497H

## (undated) DEVICE — ELECTRODE PT RET AD L9FT HI MOIST COND ADH HYDRGEL CORDED

## (undated) DEVICE — T-DRAPE,EXTREMITY,STERILE: Brand: MEDLINE

## (undated) DEVICE — STERILE POLYISOPRENE POWDER-FREE SURGICAL GLOVES: Brand: PROTEXIS

## (undated) DEVICE — VESSEL LOOPS,MAXI, BLUE: Brand: DEVON

## (undated) DEVICE — ROOM TURNOVER KIT W/ ARM STRP

## (undated) DEVICE — 3M™ STERI-STRIP™ REINFORCED ADHESIVE SKIN CLOSURES, R1547, 1/2 IN X 4 IN (12 MM X 100 MM), 6 STRIPS/ENVELOPE: Brand: 3M™ STERI-STRIP™

## (undated) DEVICE — 3M™ COBAN™ NL STERILE NON-LATEX SELF-ADHERENT WRAP, 2084S, 4 IN X 5 YD (10 CM X 4,5 M), 18 ROLLS/CASE: Brand: 3M™ COBAN™

## (undated) DEVICE — ZIP 8I SURGICAL SKIN CLOSURE DEVICE: Brand: ZIP 8I SURGICAL SKIN CLOSURE DEVICE

## (undated) DEVICE — SHEET,DRAPE,53X77,STERILE: Brand: MEDLINE

## (undated) DEVICE — K-WIRE, SINGLE ENDED TROCAR TIP, SMOOTH, 1.1MM X 150 MM
Type: IMPLANTABLE DEVICE | Site: FOOT | Status: NON-FUNCTIONAL
Brand: MONSTER SCREW SYSTEM
Removed: 2018-10-26

## (undated) DEVICE — ELECTRODE ELECSURG NDL 2.8 INX7.2 CM COAT INSUL EDGE

## (undated) DEVICE — DRESSING PETRO W3XL3IN OIL EMUL N ADH GZ KNIT IMPREG CELOS

## (undated) DEVICE — INTENDED FOR TISSUE SEPARATION, AND OTHER PROCEDURES THAT REQUIRE A SHARP SURGICAL BLADE TO PUNCTURE OR CUT.: Brand: BARD-PARKER ® STAINLESS STEEL BLADES

## (undated) DEVICE — TRAY, SKIN SCRUB,GEL,LATEX FRE: Brand: MEDLINE INDUSTRIES, INC.

## (undated) DEVICE — SYRINGE, LUER LOCK, 10ML: Brand: MEDLINE

## (undated) DEVICE — DRILL,  2.1 X 120MM, CANNULATED, AO: Brand: MONSTER SCREW SYSTEM

## (undated) DEVICE — ESMARK: Brand: DEROYAL

## (undated) DEVICE — GOWN,AURORA,NONREINF,RAGLAN,XXL,STERILE: Brand: MEDLINE

## (undated) DEVICE — PADDING CAST W4INXL4YD ST COT RAYON MICROPLEATED HIGHLY

## (undated) DEVICE — MAJOR SET UP PK

## (undated) DEVICE — COVER,MAYO STAND,STERILE: Brand: MEDLINE

## (undated) DEVICE — CRUTCH WLK AD 300LB STD AX ALUM PUSH BTTN GRDIAN

## (undated) DEVICE — SHOE POSTOP M M 7.5-9 WOM 8.5-10 HI ANK SQUARED STRP RIG

## (undated) DEVICE — DRAPE C ARM UNIV W41XL74IN CLR PLAS XR VELC CLSR POLY STRP

## (undated) DEVICE — GAUZE,SPONGE,4"X4",8PLY,STRL,LF,10/TRAY: Brand: MEDLINE